# Patient Record
Sex: MALE | Race: WHITE | NOT HISPANIC OR LATINO | Employment: FULL TIME | URBAN - METROPOLITAN AREA
[De-identification: names, ages, dates, MRNs, and addresses within clinical notes are randomized per-mention and may not be internally consistent; named-entity substitution may affect disease eponyms.]

---

## 2022-11-30 ENCOUNTER — PATIENT ROUNDING (BHMG ONLY) (OUTPATIENT)
Dept: FAMILY MEDICINE CLINIC | Facility: CLINIC | Age: 42
End: 2022-11-30

## 2022-11-30 ENCOUNTER — OFFICE VISIT (OUTPATIENT)
Dept: FAMILY MEDICINE CLINIC | Facility: CLINIC | Age: 42
End: 2022-11-30

## 2022-11-30 VITALS
SYSTOLIC BLOOD PRESSURE: 140 MMHG | HEART RATE: 84 BPM | HEIGHT: 74 IN | OXYGEN SATURATION: 96 % | TEMPERATURE: 98 F | WEIGHT: 315 LBS | DIASTOLIC BLOOD PRESSURE: 84 MMHG | BODY MASS INDEX: 40.43 KG/M2

## 2022-11-30 DIAGNOSIS — Z23 IMMUNIZATION DUE: ICD-10-CM

## 2022-11-30 DIAGNOSIS — E11.65 TYPE 2 DIABETES MELLITUS WITH HYPERGLYCEMIA, WITHOUT LONG-TERM CURRENT USE OF INSULIN: Primary | ICD-10-CM

## 2022-11-30 DIAGNOSIS — Z00.00 PREVENTATIVE HEALTH CARE: ICD-10-CM

## 2022-11-30 DIAGNOSIS — Z12.5 SCREENING FOR MALIGNANT NEOPLASM OF PROSTATE: ICD-10-CM

## 2022-11-30 DIAGNOSIS — E29.1 HYPOGONADISM IN MALE: ICD-10-CM

## 2022-11-30 DIAGNOSIS — Z51.81 ENCOUNTER FOR THERAPEUTIC DRUG MONITORING: ICD-10-CM

## 2022-11-30 PROBLEM — E66.01 CLASS 3 SEVERE OBESITY DUE TO EXCESS CALORIES WITH BODY MASS INDEX (BMI) OF 45.0 TO 49.9 IN ADULT: Status: ACTIVE | Noted: 2022-11-30

## 2022-11-30 PROBLEM — Z80.0 FAMILY HISTORY OF COLON CANCER: Status: ACTIVE | Noted: 2022-11-30

## 2022-11-30 PROBLEM — E03.9 HYPOTHYROIDISM: Status: ACTIVE | Noted: 2022-11-30

## 2022-11-30 PROBLEM — E66.813 CLASS 3 SEVERE OBESITY DUE TO EXCESS CALORIES WITH BODY MASS INDEX (BMI) OF 45.0 TO 49.9 IN ADULT: Status: ACTIVE | Noted: 2022-11-30

## 2022-11-30 PROCEDURE — 90686 IIV4 VACC NO PRSV 0.5 ML IM: CPT | Performed by: PHYSICIAN ASSISTANT

## 2022-11-30 PROCEDURE — 99204 OFFICE O/P NEW MOD 45 MIN: CPT | Performed by: PHYSICIAN ASSISTANT

## 2022-11-30 PROCEDURE — 90471 IMMUNIZATION ADMIN: CPT | Performed by: PHYSICIAN ASSISTANT

## 2022-11-30 RX ORDER — NAPROXEN SODIUM 220 MG
220 TABLET ORAL 2 TIMES DAILY PRN
Status: CANCELLED | OUTPATIENT
Start: 2022-11-30

## 2022-11-30 RX ORDER — LEVOTHYROXINE SODIUM 0.05 MG/1
50 TABLET ORAL DAILY
Qty: 30 TABLET | Refills: 5 | Status: SHIPPED | OUTPATIENT
Start: 2022-11-30

## 2022-11-30 RX ORDER — EMPAGLIFLOZIN 25 MG/1
25 TABLET, FILM COATED ORAL DAILY
Qty: 90 TABLET | Refills: 1 | Status: SHIPPED | OUTPATIENT
Start: 2022-11-30

## 2022-11-30 RX ORDER — TESTOSTERONE CYPIONATE 200 MG/ML
INJECTION, SOLUTION INTRAMUSCULAR
Status: CANCELLED | OUTPATIENT
Start: 2022-11-30

## 2022-11-30 RX ORDER — MELOXICAM 15 MG/1
15 TABLET ORAL DAILY
Qty: 90 TABLET | Refills: 1 | Status: SHIPPED | OUTPATIENT
Start: 2022-11-30

## 2022-11-30 NOTE — PROGRESS NOTES
Chief Complaint   Patient presents with   • new patient preventive medicine service   • Diabetes       HPI     Aguila Shen is a pleasant 42 y.o. male with a PMH of type 2 diabetes, hypothyroidism, and hypogonadism who presents for initial visit.     Previous PCP retired, Dr. Elias. The patient works at the [employer].   Diabetes was diagnosed about 1 year ago.  He did not tolerate metformin due to GI side effects.  He does have family history of diabetes.   He was started on levothyroxine due to his thyroid function tests being off. No labs in the past year but reports compliance with medication.  He has been following with Dr. Rosa a urologist in [place], [place] for his low testosterone.  He was seen there a couple of weeks ago for blood work with like to have his testosterone managed locally.  He was initially seen there for fertility issues but everything checked out. He has 2 children now and this is no longer a concern. He injects testosterone half a milliliter once weekly which has improved fatigue.   He has a bump on his right chest for several years.   Takes meloxicam for joint pain for several years. This improves ankle pain mostly.     Past Medical History:   Diagnosis Date   • Diabetes mellitus (HCC)        Past Surgical History:   Procedure Laterality Date   • VASECTOMY         Family History   Problem Relation Age of Onset   • Hypertension Mother    • Arthritis Mother    • Hyperlipidemia Mother    • Hypertension Father    • Diabetes Father    • Cancer Brother    • Hypertension Maternal Grandmother    • Diabetes Maternal Grandmother    • Hypertension Maternal Grandfather    • Diabetes Maternal Grandfather    • Hypertension Paternal Grandmother    • Diabetes Paternal Grandmother    • Hypertension Paternal Grandfather    • Diabetes Paternal Grandfather        Social History     Socioeconomic History   • Marital status:    Tobacco Use   • Smoking status: Never   • Smokeless  tobacco: Current     Types: Chew   Vaping Use   • Vaping Use: Never used   Substance and Sexual Activity   • Alcohol use: Yes     Alcohol/week: 1.0 standard drink     Types: 1 Cans of beer per week     Comment: socially   • Drug use: Never   • Sexual activity: Yes     Partners: Female       Allergies   Allergen Reactions   • Metformin Diarrhea       ROS    Review of Systems   Constitutional: Positive for fatigue.   Endocrine: Negative for polydipsia and polyuria.   Genitourinary: Negative for nocturia.   Musculoskeletal: Positive for arthralgias. Negative for joint swelling.       Vitals:    11/30/22 1410   BP: 140/84   Pulse: 84   Temp: 98 °F (36.7 °C)   SpO2: 96%     Body mass index is 48.92 kg/m².      Current Outpatient Medications:   •  Jardiance 25 MG tablet tablet, Take 1 tablet by mouth Daily., Disp: 90 tablet, Rfl: 1  •  levothyroxine (SYNTHROID, LEVOTHROID) 50 MCG tablet, Take 1 tablet by mouth Daily., Disp: 30 tablet, Rfl: 5  •  meloxicam (MOBIC) 15 MG tablet, Take 1 tablet by mouth Daily., Disp: 90 tablet, Rfl: 1  •  multivitamin with minerals tablet tablet, Take 1 tablet by mouth Daily., Disp: , Rfl:   •  Testosterone Cypionate (DEPOTESTOTERONE CYPIONATE) 200 MG/ML injection, INJECT 0.5 ML EVERY 7 DAYS AS DIRECTED, Disp: , Rfl:     PE    Physical Exam  Vitals reviewed.   Constitutional:       General: He is not in acute distress.     Appearance: He is well-developed. He is obese.   HENT:      Head: Normocephalic and atraumatic.   Eyes:      Conjunctiva/sclera: Conjunctivae normal.   Cardiovascular:      Rate and Rhythm: Normal rate and regular rhythm.      Heart sounds: Normal heart sounds. No murmur heard.  Pulmonary:      Effort: Pulmonary effort is normal.      Breath sounds: Normal breath sounds.   Chest:       Musculoskeletal:      Cervical back: Normal range of motion.   Skin:     General: Skin is warm and dry.   Neurological:      Mental Status: He is alert.      Gait: Gait normal.   Psychiatric:          Speech: Speech normal.         Behavior: Behavior normal.          A/P    Problem List Items Addressed This Visit        Endocrine and Metabolic    Type 2 diabetes mellitus with hyperglycemia, without long-term current use of insulin (HCC) - Primary  -Intolerance to metformin. Refill Jardiance.  -Monitor hemoglobin A1c.    Relevant Medications    Jardiance 25 MG tablet tablet   Other Visit Diagnoses     Hypogonadism in male      -This patient is on a controlled substance which improves symptoms/quality of life and is aware of the risks, benefits and possible side-effects current treatment. The patient denies any medication side-effects at this time. A controlled substance agreement will be obtained or is currently on file. We reviewed required monitoring for controlled substances including but not limited to quarterly follow-up visits, annual depression screening, and urine drug screens to which the patient is agreeable. A [facility] report has been or shortly will be reviewed. There are no signs of deviation or misuse.   -Records release requested today from urology. Once we have his records demonstrating low testosterone, we will plan to manage his testosterone here.   -Monitor labs including CBC, PSA, testosterone  -Return to clinic in 3 months for physical or sooner if needed.    Relevant Orders    Testosterone    PSA Diagnostic    Immunization due      -Influenza vaccine given today.      Preventative health care        Relevant Orders    CBC (No Diff)    Comprehensive Metabolic Panel    Hemoglobin A1c    Lipid Panel    TSH Rfx On Abnormal To Free T4    Urinalysis With Culture If Indicated - Urine, Clean Catch    Microalbumin / Creatinine Urine Ratio - Urine, Clean Catch    Hepatitis C Antibody    Encounter for therapeutic drug monitoring        Relevant Orders    Compliance Drug Analysis, Ur - Urine, Clean Catch          Plan of care was reviewed with patient at the conclusion of today's visit. Education  was provided regarding diagnoses, management, prescribed or recommended OTC products, and the importance of compliance with follow-up appointments. The patient was counseled regarding the risks, benefits, and possible side-effects of treatment. I advised the patient to keep me informed of any acute changes in their status including new, worsening, or persistent symptoms. Patient expresses understanding and agreement with the management plan.        XIAO Salazar

## 2022-12-01 ENCOUNTER — LAB (OUTPATIENT)
Dept: LAB | Facility: HOSPITAL | Age: 42
End: 2022-12-01

## 2022-12-01 DIAGNOSIS — E29.1 HYPOGONADISM IN MALE: ICD-10-CM

## 2022-12-01 DIAGNOSIS — Z00.00 PREVENTATIVE HEALTH CARE: ICD-10-CM

## 2022-12-01 LAB
ALBUMIN SERPL-MCNC: 4.5 G/DL (ref 3.5–5.2)
ALBUMIN UR-MCNC: 1.7 MG/DL
ALBUMIN/GLOB SERPL: 1.8 G/DL
ALP SERPL-CCNC: 79 U/L (ref 39–117)
ALT SERPL W P-5'-P-CCNC: 20 U/L (ref 1–41)
ANION GAP SERPL CALCULATED.3IONS-SCNC: 7 MMOL/L (ref 5–15)
AST SERPL-CCNC: 18 U/L (ref 1–40)
BILIRUB SERPL-MCNC: 0.5 MG/DL (ref 0–1.2)
BILIRUB UR QL STRIP: NEGATIVE
BUN SERPL-MCNC: 10 MG/DL (ref 6–20)
BUN/CREAT SERPL: 11.6 (ref 7–25)
CALCIUM SPEC-SCNC: 9.2 MG/DL (ref 8.6–10.5)
CHLORIDE SERPL-SCNC: 104 MMOL/L (ref 98–107)
CHOLEST SERPL-MCNC: 151 MG/DL (ref 0–200)
CLARITY UR: CLEAR
CO2 SERPL-SCNC: 27 MMOL/L (ref 22–29)
COLOR UR: YELLOW
CREAT SERPL-MCNC: 0.86 MG/DL (ref 0.76–1.27)
CREAT UR-MCNC: 158.6 MG/DL
DEPRECATED RDW RBC AUTO: 50 FL (ref 37–54)
EGFRCR SERPLBLD CKD-EPI 2021: 109.5 ML/MIN/1.73
ERYTHROCYTE [DISTWIDTH] IN BLOOD BY AUTOMATED COUNT: 15.8 % (ref 12.3–15.4)
GLOBULIN UR ELPH-MCNC: 2.5 GM/DL
GLUCOSE SERPL-MCNC: 113 MG/DL (ref 65–99)
GLUCOSE UR STRIP-MCNC: ABNORMAL MG/DL
HCT VFR BLD AUTO: 46.5 % (ref 37.5–51)
HCV AB SER DONR QL: NORMAL
HDLC SERPL-MCNC: 34 MG/DL (ref 40–60)
HGB BLD-MCNC: 14.6 G/DL (ref 13–17.7)
HGB UR QL STRIP.AUTO: NEGATIVE
KETONES UR QL STRIP: ABNORMAL
LDLC SERPL CALC-MCNC: 98 MG/DL (ref 0–100)
LDLC/HDLC SERPL: 2.83 {RATIO}
LEUKOCYTE ESTERASE UR QL STRIP.AUTO: NEGATIVE
MCH RBC QN AUTO: 27.1 PG (ref 26.6–33)
MCHC RBC AUTO-ENTMCNC: 31.4 G/DL (ref 31.5–35.7)
MCV RBC AUTO: 86.4 FL (ref 79–97)
MICROALBUMIN/CREAT UR: 10.7 MG/G
NITRITE UR QL STRIP: NEGATIVE
PH UR STRIP.AUTO: 6 [PH] (ref 5–8)
PLATELET # BLD AUTO: 330 10*3/MM3 (ref 140–450)
PMV BLD AUTO: 11 FL (ref 6–12)
POTASSIUM SERPL-SCNC: 4.5 MMOL/L (ref 3.5–5.2)
PROT SERPL-MCNC: 7 G/DL (ref 6–8.5)
PROT UR QL STRIP: ABNORMAL
PSA SERPL-MCNC: 0.51 NG/ML (ref 0–4)
RBC # BLD AUTO: 5.38 10*6/MM3 (ref 4.14–5.8)
SODIUM SERPL-SCNC: 138 MMOL/L (ref 136–145)
SP GR UR STRIP: >=1.03 (ref 1–1.03)
TESTOST SERPL-MCNC: 620 NG/DL (ref 249–836)
TRIGL SERPL-MCNC: 104 MG/DL (ref 0–150)
TSH SERPL DL<=0.05 MIU/L-ACNC: 2.49 UIU/ML (ref 0.27–4.2)
UROBILINOGEN UR QL STRIP: ABNORMAL
VLDLC SERPL-MCNC: 19 MG/DL (ref 5–40)
WBC NRBC COR # BLD: 7.51 10*3/MM3 (ref 3.4–10.8)

## 2022-12-01 PROCEDURE — 82043 UR ALBUMIN QUANTITATIVE: CPT

## 2022-12-01 PROCEDURE — 86803 HEPATITIS C AB TEST: CPT

## 2022-12-01 PROCEDURE — 80061 LIPID PANEL: CPT

## 2022-12-01 PROCEDURE — 82570 ASSAY OF URINE CREATININE: CPT

## 2022-12-01 PROCEDURE — 80050 GENERAL HEALTH PANEL: CPT

## 2022-12-01 PROCEDURE — 83036 HEMOGLOBIN GLYCOSYLATED A1C: CPT

## 2022-12-01 PROCEDURE — 81003 URINALYSIS AUTO W/O SCOPE: CPT

## 2022-12-01 PROCEDURE — 84403 ASSAY OF TOTAL TESTOSTERONE: CPT

## 2022-12-01 PROCEDURE — 84153 ASSAY OF PSA TOTAL: CPT

## 2022-12-02 ENCOUNTER — PATIENT MESSAGE (OUTPATIENT)
Dept: FAMILY MEDICINE CLINIC | Facility: CLINIC | Age: 42
End: 2022-12-02

## 2022-12-02 DIAGNOSIS — E11.65 TYPE 2 DIABETES MELLITUS WITH HYPERGLYCEMIA, WITHOUT LONG-TERM CURRENT USE OF INSULIN: Primary | ICD-10-CM

## 2022-12-02 LAB — HBA1C MFR BLD: 7 % (ref 4.8–5.6)

## 2022-12-06 LAB
DRUGS UR: NORMAL
Lab: NORMAL

## 2022-12-07 RX ORDER — TIRZEPATIDE 2.5 MG/.5ML
2.5 INJECTION, SOLUTION SUBCUTANEOUS
Qty: 2 ML | Refills: 0 | Status: SHIPPED | OUTPATIENT
Start: 2022-12-07 | End: 2023-01-04

## 2022-12-07 RX ORDER — TIRZEPATIDE 5 MG/.5ML
5 INJECTION, SOLUTION SUBCUTANEOUS
Qty: 6 ML | Refills: 0 | Status: SHIPPED | OUTPATIENT
Start: 2022-12-07 | End: 2023-02-21

## 2022-12-07 NOTE — TELEPHONE ENCOUNTER
I spoke with the patient over the phone and counseled him on the use of Mounjaro. He would like to start this for his diabetes. He denies a history of pancreatitis and personal or family history of thyroid cancer. He was advised to notify the office if he has any questions or experiences side effects. I did advise him to send me a message in a couple of weeks if he has not been contacted about his testosterone prescription. If we have not received records from urology at that time, we will need to resend his records release request.  He verbalized understanding and agreed with this plan.

## 2022-12-15 ENCOUNTER — PRIOR AUTHORIZATION (OUTPATIENT)
Dept: FAMILY MEDICINE CLINIC | Facility: CLINIC | Age: 42
End: 2022-12-15

## 2022-12-15 NOTE — TELEPHONE ENCOUNTER
Contacted pt's pharmacy to get more information, pharmacist states the prescription is requiring a PA

## 2022-12-21 NOTE — TELEPHONE ENCOUNTER
Please let the patient know his insurance denied Mounjaro because they do not consider it medically necessary.  If he has not been able to get this prescription from the pharmacy through the 's coupon, I can write an appeal letter.

## 2022-12-21 NOTE — TELEPHONE ENCOUNTER
Caller: Aguila Shen    Relationship: Self    Best call back number: [phone]    Additional notes: PATIENT STATES THE PHARMACY HAS NOT FILLED THE PRESCRIPTION FOR HIM AND WOULD LIKE TO REQUEST THAT AN APPEAL LETTER BE WRITTEN.

## 2022-12-21 NOTE — TELEPHONE ENCOUNTER
Attempted to contact, no answer. Left detailed voicemail relaying provider's message     Please let the patient know his insurance denied Mounjaro because they do not consider it medically necessary.  If he has not been able to get this prescription from the pharmacy through the 's coupon, I can write an appeal letter.  Hub can relay and document.

## 2022-12-21 NOTE — TELEPHONE ENCOUNTER
Lvm to call office back.    Jozef Ashley PA 2 hours ago (7:24 AM)     Please let the patient know his insurance denied Mounjaro because they do not consider it medically necessary.  If he has not been able to get this prescription from the pharmacy through the 's coupon, I can write an appeal letter.         Note              HUB CAN RELY MESSAGE

## 2022-12-21 NOTE — TELEPHONE ENCOUNTER
CHIEF COMPLAINT:   Chief Complaint   Patient presents with   • URI       HISTORY OF PRESENT ILLNESS: Maximo Loaiza is a 51 year old  male Who presented today secondary to symptoms of facial congestion and pressure associated with postnasal drip. Patient denied any chest congestion, denied any shortness of breath for wheezing. Does have a little bit of a cough which he attributes to postnasal drip. He does have some fullness in his ears but no pain. Patient denied any GI symptoms such as nausea vomiting abdominal pain or diarrhea. Patient denied any fever or chills but has had body aches. Symptoms are very similar to his usual symptoms of acute sinusitis.      PAST MEDICAL HISTORY:   1. Mild hyperlipidemia.    2. History of low back pain.    3. History of tinea versicolor.    4. History of seasonal allergies.    Past Surgical History   Procedure Laterality Date   • Knee scope,diagnostic  10/14/2005     left   • Colonoscopy  11/07/2013     Dr Albarado. Repeat in 10 years       MEDICATIONS:  Current Outpatient Prescriptions   Medication Sig Dispense Refill   • azithromycin (ZITHROMAX) 250 MG tablet Take 1 tablet by mouth once for 1 dose. 2 tablets first day at the same time, then one a day for the next 4 days 6 tablet 0     No current facility-administered medications for this visit.      ALLERGIES:  Allergies as of 01/23/2017 - Jason as Reviewed 01/23/2017   Allergen Reaction Noted   • Seasonal  09/25/2013     REVIEW OF SYSTEMS:  Constitutional:  Denies fever or chills, weight gain or loss.    Respiratory:  Denies shortness of breath, cough or wheezing.   Cardiovascular:  Denies chest pain or palpations, no PND or orthopnea.  Gastrointestinal:  Denies abdominal pain, nausea, vomiting, bloody stools or diarrhea.  Neurologic:  Denies headache, focal weakness or sensory changes.     PHYSICAL EXAMINATION:  Constitutional:  Well-developed, well-nourished, no acute distress, non-toxic appearance.  Respiratory: Lungs are clear.  Pt would like for you to write an appeal    No wheezing, rhonchi or rales.  Cardiovascular:  Normal rate, normal rhythm, no murmurs, no gallops, no rubs.     ASSESSMENT:   1. Acute non-recurrent maxillary sinusitis      PLAN:  Orders Placed This Encounter   • azithromycin (ZITHROMAX) 250 MG tablet     Return in about 6 months (around 7/23/2017).

## 2023-01-05 ENCOUNTER — PATIENT MESSAGE (OUTPATIENT)
Dept: FAMILY MEDICINE CLINIC | Facility: CLINIC | Age: 43
End: 2023-01-05
Payer: COMMERCIAL

## 2023-01-05 DIAGNOSIS — E29.1 HYPOGONADISM IN MALE: Primary | ICD-10-CM

## 2023-01-06 RX ORDER — TESTOSTERONE CYPIONATE 200 MG/ML
100 INJECTION, SOLUTION INTRAMUSCULAR
Qty: 2 ML | Refills: 2 | Status: SHIPPED | OUTPATIENT
Start: 2023-01-06 | End: 2023-02-21

## 2023-01-07 ENCOUNTER — PATIENT MESSAGE (OUTPATIENT)
Dept: FAMILY MEDICINE CLINIC | Facility: CLINIC | Age: 43
End: 2023-01-07
Payer: COMMERCIAL

## 2023-01-09 ENCOUNTER — TELEPHONE (OUTPATIENT)
Dept: FAMILY MEDICINE CLINIC | Facility: CLINIC | Age: 43
End: 2023-01-09
Payer: COMMERCIAL

## 2023-01-09 RX ORDER — CYCLOBENZAPRINE HCL 10 MG
10 TABLET ORAL 3 TIMES DAILY PRN
Qty: 90 TABLET | Refills: 0 | Status: SHIPPED | OUTPATIENT
Start: 2023-01-09 | End: 2023-02-06

## 2023-01-09 RX ORDER — MELOXICAM 15 MG/1
15 TABLET ORAL DAILY
Qty: 90 TABLET | Refills: 1 | OUTPATIENT
Start: 2023-01-09

## 2023-01-09 NOTE — TELEPHONE ENCOUNTER
Pt notified that he has a refill at the pharmacy.    [facility]/pharmacy #[id number] - [place], [place] - [address] - [phone] PH

## 2023-01-09 NOTE — TELEPHONE ENCOUNTER
PATIENT RETURNING A CALL REGARDING HIS MELOXICAM REFILL.  PATIENT HAS BEEN OUT OF MEDICATION SINCE 1/6/23.

## 2023-01-10 NOTE — TELEPHONE ENCOUNTER
From: Aguila Shen  To: Jozef Ashley  Sent: 1/5/2023 10:23 PM EST  Subject: Testosterone     I have a question to see if you've been able to get my medical records from [facility] yet because I'm out of testosterone and they will not refill it. I would prefer not to have to cycle off of it right now what can we do about this?

## 2023-01-11 NOTE — TELEPHONE ENCOUNTER
Contacted [facility] to see if a single or multi dose vial was dispensed. Pharmacists states that a 1ml bottle single dose vial was dispensed & the multi dose vial is on back order. Pt will need an updated script each time.

## 2023-01-30 ENCOUNTER — PATIENT MESSAGE (OUTPATIENT)
Dept: FAMILY MEDICINE CLINIC | Facility: CLINIC | Age: 43
End: 2023-01-30
Payer: COMMERCIAL

## 2023-02-06 RX ORDER — CYCLOBENZAPRINE HCL 10 MG
TABLET ORAL
Qty: 90 TABLET | Refills: 0 | Status: SHIPPED | OUTPATIENT
Start: 2023-02-06

## 2023-02-21 ENCOUNTER — OFFICE VISIT (OUTPATIENT)
Dept: FAMILY MEDICINE CLINIC | Facility: CLINIC | Age: 43
End: 2023-02-21
Payer: COMMERCIAL

## 2023-02-21 VITALS
OXYGEN SATURATION: 98 % | TEMPERATURE: 97.1 F | SYSTOLIC BLOOD PRESSURE: 134 MMHG | HEART RATE: 82 BPM | DIASTOLIC BLOOD PRESSURE: 86 MMHG | HEIGHT: 74 IN | BODY MASS INDEX: 40.43 KG/M2 | WEIGHT: 315 LBS

## 2023-02-21 DIAGNOSIS — E29.1 HYPOGONADISM IN MALE: ICD-10-CM

## 2023-02-21 DIAGNOSIS — E66.01 CLASS 3 SEVERE OBESITY DUE TO EXCESS CALORIES WITH SERIOUS COMORBIDITY AND BODY MASS INDEX (BMI) OF 45.0 TO 49.9 IN ADULT: ICD-10-CM

## 2023-02-21 DIAGNOSIS — E29.1 HYPOGONADISM, MALE: ICD-10-CM

## 2023-02-21 DIAGNOSIS — E11.65 TYPE 2 DIABETES MELLITUS WITH HYPERGLYCEMIA, WITHOUT LONG-TERM CURRENT USE OF INSULIN: ICD-10-CM

## 2023-02-21 DIAGNOSIS — Z00.00 PREVENTATIVE HEALTH CARE: Primary | ICD-10-CM

## 2023-02-21 DIAGNOSIS — E03.9 HYPOTHYROIDISM, UNSPECIFIED TYPE: ICD-10-CM

## 2023-02-21 DIAGNOSIS — Z80.0 FAMILY HISTORY OF COLON CANCER: ICD-10-CM

## 2023-02-21 PROCEDURE — 99396 PREV VISIT EST AGE 40-64: CPT | Performed by: PHYSICIAN ASSISTANT

## 2023-02-21 RX ORDER — TESTOSTERONE CYPIONATE 100 MG/ML
VIAL (ML) INTRAMUSCULAR
OUTPATIENT
Start: 2023-02-21

## 2023-02-21 RX ORDER — TESTOSTERONE CYPIONATE 200 MG/ML
100 INJECTION, SOLUTION INTRAMUSCULAR
Qty: 2 ML | Refills: 2 | OUTPATIENT
Start: 2023-02-21

## 2023-02-21 RX ORDER — LISINOPRIL 5 MG/1
5 TABLET ORAL DAILY
Qty: 90 TABLET | Refills: 1 | Status: SHIPPED | OUTPATIENT
Start: 2023-02-21

## 2023-02-21 RX ORDER — TESTOSTERONE CYPIONATE 100 MG/ML
1 VIAL (ML) INTRAMUSCULAR
Qty: 4 ML | Refills: 2 | Status: SHIPPED | OUTPATIENT
Start: 2023-02-21 | End: 2023-02-27

## 2023-02-21 RX ORDER — ROSUVASTATIN CALCIUM 5 MG/1
5 TABLET, COATED ORAL DAILY
Qty: 90 TABLET | Refills: 1 | Status: SHIPPED | OUTPATIENT
Start: 2023-02-21

## 2023-02-21 NOTE — TELEPHONE ENCOUNTER
Rx Refill Note  Requested Prescriptions     Pending Prescriptions Disp Refills   • Testosterone Cypionate (DEPOTESTOTERONE CYPIONATE) 200 MG/ML injection [Pharmacy Med Name: TESTOSTERONE  MG/ML] 2 mL 2     Sig: INJECT 0.5 ML INTO THE APPROPRIATE MUSCLE AS DIRECTED BY PRESCRIBER EVERY 7 (SEVEN) DAYS.      Last office visit with prescribing clinician: Visit date not found   Last telemedicine visit with prescribing clinician: 2/21/2023   Next office visit with prescribing clinician: Visit date not found                         Would you like a call back once the refill request has been completed: [] Yes [] No    If the office needs to give you a call back, can they leave a voicemail: [] Yes [] No    Heydi Garces MA  02/21/23, 07:36 EST

## 2023-02-21 NOTE — PROGRESS NOTES
"Reason for visit    Aguila Shen is a 42 y.o. male who presents for annual comprehensive exam.    Chief Complaint   Patient presents with   • Annual Exam     Not fasting for labs   • Type 2 diabetes mellitus with hyperglycemia   • Hypogonadism   • Diabetic Eye Exam     Completed 1-2023       HPI     Here for physical.   His insurance would not cover Mounjaro because his A1c was not greater than 7.0.    Diet/Physical activity:  -He reports his diet is \"about the same.\" He has been working on portion control however and has lost 11 pounds since November.   -He does not follow a low carbohydrate diet.   -Interested in referral to dietician.   -He reports \"not much\" exercise outside of his work.     Immunizations:  -He cannot recall prior pneumococcal vaccination.   -He declines COVID vaccination.     Cancer screening:   -Positive Fhx: colon cancer (brother - age 44).  -Negative Fhx: prostate, testicular cancer.   -He reports a colonoscopy last year was normal.      Depression: PHQ-2 Depression Screening  -Negative.     Substance use:  -Current smokeless tobacco use.   -Declines recreational drug use.   -2 beers/month.  -2-3 sodas per week.      Sexual health:  -Monogamous relationship x 6 years.      Dental/vision/skin:  -Overdue for dental exam.   -He reports an eye exam in January but did not have a dilated retinal exam.   -    Past Medical History:   Diagnosis Date   • Diabetes mellitus (HCC)        Past Surgical History:   Procedure Laterality Date   • VASECTOMY         Family History   Problem Relation Age of Onset   • Hypertension Mother    • Arthritis Mother    • Hyperlipidemia Mother    • Hypertension Father    • Diabetes Father    • Cancer Brother    • Hypertension Maternal Grandmother    • Diabetes Maternal Grandmother    • Hypertension Maternal Grandfather    • Diabetes Maternal Grandfather    • Hypertension Paternal Grandmother    • Diabetes Paternal Grandmother    • Hypertension Paternal Grandfather    • " Diabetes Paternal Grandfather        Social History     Socioeconomic History   • Marital status:    Tobacco Use   • Smoking status: Never   • Smokeless tobacco: Current     Types: Chew   Vaping Use   • Vaping Use: Never used   Substance and Sexual Activity   • Alcohol use: Yes     Alcohol/week: 1.0 standard drink     Types: 1 Cans of beer per week     Comment: socially   • Drug use: Never   • Sexual activity: Yes     Partners: Female       Allergies   Allergen Reactions   • Metformin Diarrhea       ROS    Review of Systems   Constitutional: Negative for chills, diaphoresis, fever and unexpected weight loss.   HENT: Positive for congestion and postnasal drip. Negative for hearing loss, rhinorrhea and sore throat.    Eyes: Negative for blurred vision and visual disturbance.   Respiratory: Negative for cough and shortness of breath.    Cardiovascular: Negative for chest pain.   Gastrointestinal: Positive for GERD (occasional). Negative for abdominal pain, blood in stool, constipation and diarrhea.   Endocrine: Negative for polydipsia.   Genitourinary: Negative for difficulty urinating, dysuria, hematuria, nocturia, scrotal swelling and testicular pain.   Musculoskeletal: Positive for arthralgias. Negative for myalgias.   Skin: Negative for rash and skin lesions.   Allergic/Immunologic: Positive for environmental allergies.   Neurological: Negative for dizziness, numbness and headache.   Psychiatric/Behavioral: Positive for sleep disturbance. Negative for depressed mood. The patient is not nervous/anxious.        Vitals:    02/21/23 1426   BP: 134/86   Pulse:    Temp:    SpO2:      Body mass index is 47.51 kg/m².      Current Outpatient Medications:   •  Jardiance 25 MG tablet tablet, Take 1 tablet by mouth Daily., Disp: 90 tablet, Rfl: 1  •  levothyroxine (SYNTHROID, LEVOTHROID) 50 MCG tablet, Take 1 tablet by mouth Daily., Disp: 30 tablet, Rfl: 5  •  meloxicam (MOBIC) 15 MG tablet, Take 1 tablet by mouth  "Daily., Disp: 90 tablet, Rfl: 1  •  multivitamin with minerals tablet tablet, Take 1 tablet by mouth Daily., Disp: , Rfl:   •  Syringe/Needle, Disp, 25G X 1\" 1 ML misc, Use to inject 0.5 ml of testosterone once every 7 days, Disp: 25 each, Rfl: 1  •  Testosterone Cypionate (DEPOTESTOTERONE CYPIONATE) 200 MG/ML injection, Inject 0.5 mL into the appropriate muscle as directed by prescriber Every 7 (Seven) Days., Disp: 2 mL, Rfl: 2  •  cyclobenzaprine (FLEXERIL) 10 MG tablet, TAKE 1 TABLET BY MOUTH 3 TIMES A DAY AS NEEDED FOR MUSCLE SPASMS., Disp: 90 tablet, Rfl: 0  •  lisinopril (PRINIVIL,ZESTRIL) 5 MG tablet, Take 1 tablet by mouth Daily., Disp: 90 tablet, Rfl: 1  •  rosuvastatin (Crestor) 5 MG tablet, Take 1 tablet by mouth Daily., Disp: 90 tablet, Rfl: 1    PE     Physical Exam  Vitals reviewed.   Constitutional:       General: He is not in acute distress.     Appearance: He is well-developed. He is obese.   HENT:      Head: Normocephalic and atraumatic.      Right Ear: Hearing and tympanic membrane normal.      Left Ear: Hearing and tympanic membrane normal.      Mouth/Throat:      Mouth: Mucous membranes are moist.      Dentition: Normal dentition.      Pharynx: Oropharynx is clear.   Eyes:      Extraocular Movements: Extraocular movements intact.      Conjunctiva/sclera: Conjunctivae normal.      Pupils: Pupils are equal, round, and reactive to light.      Comments:      Neck:      Thyroid: No thyroid mass or thyromegaly.      Vascular: No carotid bruit.   Cardiovascular:      Rate and Rhythm: Normal rate and regular rhythm.      Pulses:           Dorsalis pedis pulses are 2+ on the right side and 2+ on the left side.        Posterior tibial pulses are 2+ on the right side and 2+ on the left side.      Heart sounds: No murmur heard.    No friction rub.   Pulmonary:      Effort: Pulmonary effort is normal.      Breath sounds: Normal breath sounds.   Abdominal:      General: Bowel sounds are normal. There is no " abdominal bruit.      Palpations: Abdomen is soft. There is no mass.      Tenderness: There is no abdominal tenderness.   Musculoskeletal:         General: Normal range of motion.      Cervical back: Normal range of motion and neck supple.      Right foot: No deformity.      Left foot: No deformity.   Feet:      Right foot:      Protective Sensation: 6 sites tested. 6 sites sensed.      Skin integrity: Skin integrity normal. No ulcer or erythema.      Left foot:      Protective Sensation: 6 sites tested. 6 sites sensed.      Skin integrity: Skin integrity normal. No ulcer or erythema.      Comments: Diabetic Foot Exam Performed  Lymphadenopathy:      Cervical: No cervical adenopathy.      Upper Body:      Right upper body: No supraclavicular adenopathy.      Left upper body: No supraclavicular adenopathy.   Skin:     General: Skin is warm and dry.      Findings: No rash.      Nails: There is no clubbing.      Comments: No suspicious nevi   Neurological:      Mental Status: He is alert.      Gait: Gait normal.      Deep Tendon Reflexes: Reflexes normal.   Psychiatric:         Speech: Speech normal.         Behavior: Behavior normal.         A/P    Problem List Items Addressed This Visit        Endocrine and Metabolic    Type 2 diabetes mellitus with hyperglycemia, without long-term current use of insulin ([facility])  -A1c was 7.0 on 12/1/2022.  -Continue Jardiance 25 mg daily at this time.  -Refer to a dietitian.  -Monitor A1c in 2 weeks.  Patient will go to the lab in [place] to have this test completed at that time.  Further management pending results  -He is interested in starting rosuvastatin and lisinopril after discussion.   -Return to clinic in 3 months.    Relevant Medications    Jardiance 25 MG tablet tablet    Other Relevant Orders    Ambulatory Referral to Nutrition Services    Hemoglobin A1c    Hypothyroidism  -TSH normal on December labs.  -Continue levothyroxine.    Relevant Medications    levothyroxine  (SYNTHROID, LEVOTHROID) 50 MCG tablet    Hypogonadism, male  -Doing well.   -Continue testosterone injections weekly.   -Return to clinic in 3 months for controlled substance monitoring.      Class 3 severe obesity due to excess calories with body mass index (BMI) of 45.0 to 49.9 in adult (HCC)  -Class 3 Severe Obesity (BMI >=40). Obesity-related health conditions include the following: diabetes mellitus, GERD and osteoarthritis. Obesity is improving with lifestyle modifications. BMI is is above average; BMI management plan is completed. We discussed low calorie, low carb based diet program, portion control and increasing exercise.  -Patient would be interested in a trial of Ozempic or Wegovy if we can get this covered by insurance.  We will wait to see what his A1c is here in a couple of weeks.       Family History    Family history of colon cancer  -Prior negative colonoscopy last year per patient.  -Plan to recheck colonoscopy in 2027.     Other Visit Diagnoses     Preventative health care    -  Primary  -Counseled patient regarding cancer screening, immunizations, healthy lifestyle, diet, maintaining a healthy weight, and exercise.   -Annual dental and dilated eye exams were encouraged.   -Patient wants to consider Prevnar 20.  Information provided.          Plan of care was reviewed with patient at the conclusion of today's visit. Education was provided regarding diagnoses, management, treatment plan, and the importance of keeping follow-up appointments. The patient was counseled regarding the risks, benefits, and possible side-effects of treatment. Patient and/or family expresses understanding and agreement with the management plan.        XIAO Salazar

## 2023-02-23 ENCOUNTER — PRIOR AUTHORIZATION (OUTPATIENT)
Dept: FAMILY MEDICINE CLINIC | Facility: CLINIC | Age: 43
End: 2023-02-23
Payer: COMMERCIAL

## 2023-02-23 NOTE — TELEPHONE ENCOUNTER
(Key: [id number])  Med:Testosterone Cypionate 100MG/ML  Status:sent to plan, awaiting determination  Created:02/23/23

## 2023-02-24 ENCOUNTER — TELEPHONE (OUTPATIENT)
Dept: FAMILY MEDICINE CLINIC | Facility: CLINIC | Age: 43
End: 2023-02-24
Payer: COMMERCIAL

## 2023-02-24 ENCOUNTER — PATIENT MESSAGE (OUTPATIENT)
Dept: FAMILY MEDICINE CLINIC | Facility: CLINIC | Age: 43
End: 2023-02-24
Payer: COMMERCIAL

## 2023-02-24 DIAGNOSIS — E29.1 HYPOGONADISM IN MALE: Primary | ICD-10-CM

## 2023-02-24 NOTE — TELEPHONE ENCOUNTER
"Caller: Aguila Shen    Relationship: Self    Best call back number: [phone]    What medications are you currently taking:   Current Outpatient Medications on File Prior to Visit   Medication Sig Dispense Refill   • cyclobenzaprine (FLEXERIL) 10 MG tablet TAKE 1 TABLET BY MOUTH 3 TIMES A DAY AS NEEDED FOR MUSCLE SPASMS. 90 tablet 0   • Jardiance 25 MG tablet tablet Take 1 tablet by mouth Daily. 90 tablet 1   • levothyroxine (SYNTHROID, LEVOTHROID) 50 MCG tablet Take 1 tablet by mouth Daily. 30 tablet 5   • lisinopril (PRINIVIL,ZESTRIL) 5 MG tablet Take 1 tablet by mouth Daily. 90 tablet 1   • meloxicam (MOBIC) 15 MG tablet Take 1 tablet by mouth Daily. 90 tablet 1   • multivitamin with minerals tablet tablet Take 1 tablet by mouth Daily.     • rosuvastatin (Crestor) 5 MG tablet Take 1 tablet by mouth Daily. 90 tablet 1   • Syringe/Needle, Disp, 25G X 1\" 1 ML misc Use to inject 0.5 ml of testosterone once every 7 days 25 each 1   • Testosterone Cypionate 100 MG/ML solution Inject 1 mL into the appropriate muscle as directed by prescriber Every 7 (Seven) Days. 4 mL 2     No current facility-administered medications on file prior to visit.      When did you start taking these medications: HAS ALREADY TAKEN THE TESTOSTERONE, BUT HAS NEVER TAKEN ROSUVASTATIN.    Which medication are you concerned about: TESTOSTERONE AND ROSUVASTATIN    Who prescribed you this medication: ADNREI GILBERT    What are your concerns: PATIENT STATES THAT THE PHARMACY SAID THAT THE ROSUVASTATIN COULD NOT BE PICKED UP DUE TO IT BEING \"TOO EARLY FOR .\"     PATIENT STATES THAT THE PHARMACY DOES NOT STOCK THE 100MG/ML TESTOSTERONE VIALS SO HE NEEDS A NEW PRESCRIPTION FOR THE 200MG/ML    How long have you had these concerns: 2/23/23    "

## 2023-02-27 RX ORDER — TESTOSTERONE CYPIONATE 200 MG/ML
200 INJECTION, SOLUTION INTRAMUSCULAR
Qty: 2 ML | Refills: 2 | Status: SHIPPED | OUTPATIENT
Start: 2023-02-27

## 2023-02-27 NOTE — TELEPHONE ENCOUNTER
Contacted pharmacy to inquire further. Pharmacists states that the previous Testosterone scripts were for 200ml injecting 0.5ml and that is covered by insurance however the new script of 100ml injecting 1ml is not covered even though the injection amount is the same. Pharmacy requesting the script be sent like the previous.  Rosuvastatin was not requested by pt originally however they will get that one ready.

## 2023-04-11 ENCOUNTER — PRIOR AUTHORIZATION (OUTPATIENT)
Dept: FAMILY MEDICINE CLINIC | Facility: CLINIC | Age: 43
End: 2023-04-11
Payer: COMMERCIAL

## 2023-04-25 ENCOUNTER — PATIENT MESSAGE (OUTPATIENT)
Dept: FAMILY MEDICINE CLINIC | Facility: CLINIC | Age: 43
End: 2023-04-25
Payer: COMMERCIAL

## 2023-04-25 DIAGNOSIS — E29.1 HYPOGONADISM IN MALE: Primary | ICD-10-CM

## 2023-04-26 NOTE — TELEPHONE ENCOUNTER
From: Aguila Shen  To: Jozef Ashley  Sent: 4/25/2023 10:29 AM EDT  Subject: Info    I have a question with use changing how I do my testosterone to biweekly from weekly I feel very santillan that second week and even my wife has noticed I'm being very short. Also I've noticed a change in my sleep patterns like im not sleeping as good that 2nd week, im aware shift work does play into that but sarah been doing the same shift schedule for 9 months now. Will my levels drop that much from week to week that this be affecting me like that? If so I want to switch back to the weekly dosing.

## 2023-04-27 ENCOUNTER — PATIENT MESSAGE (OUTPATIENT)
Dept: FAMILY MEDICINE CLINIC | Facility: CLINIC | Age: 43
End: 2023-04-27
Payer: COMMERCIAL

## 2023-04-28 ENCOUNTER — PRIOR AUTHORIZATION (OUTPATIENT)
Dept: FAMILY MEDICINE CLINIC | Facility: CLINIC | Age: 43
End: 2023-04-28
Payer: COMMERCIAL

## 2023-04-28 NOTE — TELEPHONE ENCOUNTER
(Key: [id number])   Med:Testosterone Cypionate 200MG/ML intramuscular solution  Status:sent to plan, awaiting determination   Created:01/28/2023

## 2023-05-11 ENCOUNTER — LAB (OUTPATIENT)
Dept: LAB | Facility: HOSPITAL | Age: 43
End: 2023-05-11
Payer: COMMERCIAL

## 2023-05-11 DIAGNOSIS — E11.65 TYPE 2 DIABETES MELLITUS WITH HYPERGLYCEMIA, WITHOUT LONG-TERM CURRENT USE OF INSULIN: ICD-10-CM

## 2023-05-11 DIAGNOSIS — E29.1 HYPOGONADISM IN MALE: ICD-10-CM

## 2023-05-11 PROCEDURE — 84403 ASSAY OF TOTAL TESTOSTERONE: CPT

## 2023-05-11 PROCEDURE — 84402 ASSAY OF FREE TESTOSTERONE: CPT

## 2023-05-11 PROCEDURE — 83036 HEMOGLOBIN GLYCOSYLATED A1C: CPT

## 2023-05-12 LAB — HBA1C MFR BLD: 6.7 % (ref 4.8–5.6)

## 2023-05-15 LAB
TESTOST FREE SERPL-MCNC: 11.1 PG/ML (ref 6.8–21.5)
TESTOST SERPL-MCNC: 186 NG/DL (ref 264–916)

## 2023-05-16 ENCOUNTER — PATIENT MESSAGE (OUTPATIENT)
Dept: FAMILY MEDICINE CLINIC | Facility: CLINIC | Age: 43
End: 2023-05-16
Payer: COMMERCIAL

## 2023-05-17 DIAGNOSIS — E29.1 HYPOGONADISM IN MALE: ICD-10-CM

## 2023-05-17 NOTE — TELEPHONE ENCOUNTER
From: Aguila Shen  To: Jozef Ashley  Sent: 5/16/2023 9:14 AM EDT  Subject: Lab results     So after seeing my lab results what are we thinking? I don't remember how low I've seen it before but with my levels falling like that does that explain what I told you I'm feeling? Can we switch back to a weekly dose? Please advise on what we can do cause this feels like I'm on a roller-coaster and I don't like it at all.

## 2023-05-18 RX ORDER — TESTOSTERONE CYPIONATE 200 MG/ML
100 INJECTION, SOLUTION INTRAMUSCULAR
Qty: 4 ML | Refills: 0 | Status: SHIPPED | OUTPATIENT
Start: 2023-05-18

## 2023-06-08 ENCOUNTER — OFFICE VISIT (OUTPATIENT)
Dept: FAMILY MEDICINE CLINIC | Facility: CLINIC | Age: 43
End: 2023-06-08
Payer: COMMERCIAL

## 2023-06-08 VITALS
WEIGHT: 315 LBS | BODY MASS INDEX: 40.43 KG/M2 | DIASTOLIC BLOOD PRESSURE: 70 MMHG | HEART RATE: 70 BPM | TEMPERATURE: 98.4 F | HEIGHT: 74 IN | SYSTOLIC BLOOD PRESSURE: 120 MMHG | OXYGEN SATURATION: 96 %

## 2023-06-08 DIAGNOSIS — Z80.0 FAMILY HISTORY OF COLON CANCER: ICD-10-CM

## 2023-06-08 DIAGNOSIS — E11.65 TYPE 2 DIABETES MELLITUS WITH HYPERGLYCEMIA, WITHOUT LONG-TERM CURRENT USE OF INSULIN: Primary | ICD-10-CM

## 2023-06-08 DIAGNOSIS — E66.01 CLASS 3 SEVERE OBESITY WITH SERIOUS COMORBIDITY AND BODY MASS INDEX (BMI) OF 45.0 TO 49.9 IN ADULT, UNSPECIFIED OBESITY TYPE: ICD-10-CM

## 2023-06-08 DIAGNOSIS — E29.1 HYPOGONADISM IN MALE: ICD-10-CM

## 2023-06-08 DIAGNOSIS — E03.9 HYPOTHYROIDISM, UNSPECIFIED TYPE: ICD-10-CM

## 2023-06-08 NOTE — PROGRESS NOTES
"Chief Complaint   Patient presents with   • Establish Care       Subjective      Aguila Shen is a 42 y.o. who presents to [facility]. Lives in [place] with his wife and two girls (3 and 1).  Works at [employer].    Diabetes - diagnosed a year ago.  Taking Jardiance.  Could not tolerate metformin d/t GI side effects. Had eye exam in January, but did not get diabetic eye exam, knows now to request. Obesity - waiting on nutrition referral from previous provider.  No history of thyroid cancer. On statin. On ACE.     Has SHANE.  Uses cpap.    Hypothyroidism - takes levothyroxine.  Last labs 6 months ago and normal.     Testosterone - previous primary care was managing. Recent increase to weekly from every other week d/t low testosterone level.     Brother had Colon CA.  Diagnosed in 40's.  Had colonoscopy and it was clear 2021.  Repeat in 5 years.    The following portions of the patient's history were reviewed and updated as appropriate: allergies, current medications, past family history, past medical history, past social history, past surgical history and problem list.    Review of Systems   Constitutional:  Negative for chills, fatigue and fever.   Eyes: Negative.    Respiratory:  Negative for chest tightness and shortness of breath.    Cardiovascular:  Negative for chest pain and palpitations.   Gastrointestinal: Negative.    Genitourinary:  Negative for dysuria, hematuria, nocturia, erectile dysfunction, penile swelling, scrotal swelling and testicular pain.   Musculoskeletal:  Positive for myalgias. Negative for joint swelling.   Neurological:  Negative for dizziness and headache.   Psychiatric/Behavioral:  Positive for stress. Negative for agitation, sleep disturbance and depressed mood. The patient is not nervous/anxious.      Objective   Vital Signs:  /70 (BP Location: Right arm, Patient Position: Sitting, Cuff Size: Adult)   Pulse 70   Temp 98.4 °F (36.9 °C) (Infrared)   Ht 188 cm (74\")   Wt " (!) 164 kg (362 lb 9.6 oz)   SpO2 96%   BMI 46.56 kg/m²               Physical Exam  Vitals and nursing note reviewed.   Constitutional:       Appearance: Normal appearance.   Cardiovascular:      Rate and Rhythm: Normal rate and regular rhythm.      Heart sounds: Normal heart sounds.   Pulmonary:      Breath sounds: Normal breath sounds.   Abdominal:      General: Bowel sounds are normal.      Palpations: Abdomen is soft.      Tenderness: There is no abdominal tenderness.   Neurological:      General: No focal deficit present.      Mental Status: He is alert and oriented to person, place, and time.   Psychiatric:         Mood and Affect: Mood normal.         Behavior: Behavior normal.          Result Review                     Assessment and Plan  Diagnoses and all orders for this visit:    1. Type 2 diabetes mellitus with hyperglycemia, without long-term current use of insulin (Primary)  -     Ambulatory Referral to Diabetic Education    2. Hypogonadism in male    3. Hypothyroidism, unspecified type    4. Family history of colon cancer    5. Class 3 severe obesity with serious comorbidity and body mass index (BMI) of 45.0 to 49.9 in adult, unspecified obesity type  -     Ambulatory Referral to Diabetic Education      Referral to diabetic educator.  Patient to return for labs. Consider adding Ozempic with results. Patient is agreeable with this. Follow up in 3 months for recheck.    Will consult with physician regarding refills on testosterone.      Discussed medications prescribed and OTC medications recommended.  Discussed medication safety, possible side effects and how to take or administer medications. Instructed patient to report any adverse reactions, side effects or concerns.     Reviewed physical exam findings and plan with patient who verbalized understanding and agrees with plan of care. Patient was given opportunity to ask questions and all concerns were addressed prior to the conclusion of today's  visit.           Follow Up  Return in about 3 months (around 9/8/2023).  Patient was given instructions and counseling regarding his condition or for health maintenance advice. Please see specific information pulled into the AVS if appropriate.

## 2023-06-08 NOTE — TELEPHONE ENCOUNTER
Patient had decrease to every other week injections in the last 6 months and last testosterone was low and he was symptomatic, so he was increased again to weekly injections by previous provider.  He has one dose left and needs refill.

## 2023-06-09 ENCOUNTER — PATIENT MESSAGE (OUTPATIENT)
Dept: FAMILY MEDICINE CLINIC | Facility: CLINIC | Age: 43
End: 2023-06-09
Payer: COMMERCIAL

## 2023-06-09 DIAGNOSIS — G47.33 OBSTRUCTIVE SLEEP APNEA: Primary | ICD-10-CM

## 2023-06-09 RX ORDER — TESTOSTERONE CYPIONATE 200 MG/ML
100 INJECTION, SOLUTION INTRAMUSCULAR
Qty: 4 ML | Refills: 2 | Status: SHIPPED | OUTPATIENT
Start: 2023-06-09

## 2023-06-09 NOTE — TELEPHONE ENCOUNTER
From: Aguila Shen  To: Danni Guevara  Sent: 6/9/2023 2:07 PM EDT  Subject: Cpap    I have a very simple question for you, I currently need to get some cpap supplies and I'm having issues doing that with. Do you have any recommendations on a place to get them through? I was using [facility] in [place] and they can't figure things out to get me them. Whatever place you can refer me to will be greatly appreciated I know they will need a prescription as well. This has been headache dealing with with this and I need some help resolving this please. Thank you!!!

## 2023-06-13 ENCOUNTER — TELEPHONE (OUTPATIENT)
Dept: FAMILY MEDICINE CLINIC | Facility: CLINIC | Age: 43
End: 2023-06-13
Payer: COMMERCIAL

## 2023-06-13 NOTE — TELEPHONE ENCOUNTER
Regarding: FW: Cpap  Contact: [phone]  I put an order in for cpap supplies. (Should be on printer).  Can someone please fax to [facility] and then call the patient to let him know it was faxed and provide a contact number for [facility] so he can follow up?    Thank you!    ----- Message -----  From: Aguila Shen  Sent: 6/9/2023   9:24 PM EDT  To: PETERSON Preston  Subject: Cpap                                             Yes please, will they contact me or should I get there number and contact them?

## 2023-06-15 ENCOUNTER — PATIENT ROUNDING (BHMG ONLY) (OUTPATIENT)
Dept: FAMILY MEDICINE CLINIC | Facility: CLINIC | Age: 43
End: 2023-06-15
Payer: COMMERCIAL

## 2023-06-15 NOTE — PROGRESS NOTES
A My-Chart message has been sent to the patient for PATIENT ROUNDING with [facility].

## 2023-08-10 ENCOUNTER — HOSPITAL ENCOUNTER (OUTPATIENT)
Dept: NUTRITION | Facility: HOSPITAL | Age: 43
Setting detail: RECURRING SERIES
Discharge: HOME | End: 2023-08-10
Payer: COMMERCIAL

## 2023-08-10 PROCEDURE — 97802 MEDICAL NUTRITION INDIV IN: CPT

## 2023-08-10 NOTE — PROGRESS NOTES
The patient attended their scheduled 60 minute Type II Diabetes Nutrition only education visit today. Please see media tab for assessment and notes if you use EPIC. If you are not an EPIC user a copy of patient's assessment and notes will be sent per routine. Thank you.

## 2023-08-19 NOTE — TELEPHONE ENCOUNTER
Rx Refill Note  Requested Prescriptions     Pending Prescriptions Disp Refills    rosuvastatin (CRESTOR) 5 MG tablet [Pharmacy Med Name: ROSUVASTATIN CALCIUM 5 MG TAB] 90 tablet 1     Sig: TAKE 1 TABLET BY MOUTH EVERY DAY    lisinopril (PRINIVIL,ZESTRIL) 5 MG tablet [Pharmacy Med Name: LISINOPRIL 5 MG TABLET] 90 tablet 1     Sig: TAKE 1 TABLET BY MOUTH EVERY DAY      Last office visit with prescribing clinician: 2/21/2023   Last telemedicine visit with prescribing clinician: Visit date not found   Next office visit with prescribing clinician: Visit date not found                         Would you like a call back once the refill request has been completed: [] Yes [] No    If the office needs to give you a call back, can they leave a voicemail: [] Yes [] No    Margarita Posadas MA  08/19/23, 10:55 EDT

## 2023-08-21 RX ORDER — ROSUVASTATIN CALCIUM 5 MG/1
TABLET, COATED ORAL
Qty: 90 TABLET | Refills: 1 | Status: SHIPPED | OUTPATIENT
Start: 2023-08-21

## 2023-08-21 RX ORDER — MELOXICAM 15 MG/1
TABLET ORAL
Qty: 30 TABLET | Refills: 0 | Status: SHIPPED | OUTPATIENT
Start: 2023-08-21

## 2023-08-21 RX ORDER — LISINOPRIL 5 MG/1
TABLET ORAL
Qty: 90 TABLET | Refills: 1 | Status: SHIPPED | OUTPATIENT
Start: 2023-08-21

## 2023-08-24 ENCOUNTER — LAB (OUTPATIENT)
Dept: LAB | Facility: HOSPITAL | Age: 43
End: 2023-08-24
Payer: COMMERCIAL

## 2023-08-24 PROCEDURE — 83036 HEMOGLOBIN GLYCOSYLATED A1C: CPT

## 2023-08-24 PROCEDURE — 80050 GENERAL HEALTH PANEL: CPT

## 2023-08-24 PROCEDURE — 84439 ASSAY OF FREE THYROXINE: CPT

## 2023-08-24 PROCEDURE — 80061 LIPID PANEL: CPT

## 2023-09-06 DIAGNOSIS — E29.1 HYPOGONADISM IN MALE: ICD-10-CM

## 2023-09-08 ENCOUNTER — OFFICE VISIT (OUTPATIENT)
Dept: FAMILY MEDICINE CLINIC | Facility: CLINIC | Age: 43
End: 2023-09-08
Payer: COMMERCIAL

## 2023-09-08 VITALS
BODY MASS INDEX: 40.43 KG/M2 | HEIGHT: 74 IN | OXYGEN SATURATION: 94 % | WEIGHT: 315 LBS | HEART RATE: 76 BPM | TEMPERATURE: 97.1 F | SYSTOLIC BLOOD PRESSURE: 102 MMHG | DIASTOLIC BLOOD PRESSURE: 60 MMHG | RESPIRATION RATE: 18 BRPM

## 2023-09-08 DIAGNOSIS — J30.9 ALLERGIC RHINITIS, UNSPECIFIED SEASONALITY, UNSPECIFIED TRIGGER: ICD-10-CM

## 2023-09-08 DIAGNOSIS — E11.65 TYPE 2 DIABETES MELLITUS WITH HYPERGLYCEMIA, WITHOUT LONG-TERM CURRENT USE OF INSULIN: Primary | ICD-10-CM

## 2023-09-08 DIAGNOSIS — R07.9 CHEST PAIN, UNSPECIFIED TYPE: ICD-10-CM

## 2023-09-08 DIAGNOSIS — G47.33 OBSTRUCTIVE SLEEP APNEA: ICD-10-CM

## 2023-09-08 DIAGNOSIS — E29.1 HYPOGONADISM IN MALE: ICD-10-CM

## 2023-09-08 PROCEDURE — 99214 OFFICE O/P EST MOD 30 MIN: CPT

## 2023-09-08 PROCEDURE — 93000 ELECTROCARDIOGRAM COMPLETE: CPT

## 2023-09-08 RX ORDER — NEEDLES, SAFETY 18GX1 1/2"
NEEDLE, DISPOSABLE MISCELLANEOUS
COMMUNITY
Start: 2023-07-13

## 2023-09-08 RX ORDER — TESTOSTERONE CYPIONATE 200 MG/ML
100 INJECTION, SOLUTION INTRAMUSCULAR
Qty: 4 ML | Refills: 2 | Status: SHIPPED | OUTPATIENT
Start: 2023-09-08

## 2023-09-08 RX ORDER — FLUTICASONE PROPIONATE 50 MCG
2 SPRAY, SUSPENSION (ML) NASAL DAILY
Qty: 18.2 ML | Refills: 2 | Status: SHIPPED | OUTPATIENT
Start: 2023-09-08

## 2023-09-08 RX ORDER — TESTOSTERONE CYPIONATE 200 MG/ML
100 INJECTION, SOLUTION INTRAMUSCULAR
Qty: 4 ML | Refills: 2 | Status: CANCELLED | OUTPATIENT
Start: 2023-09-08

## 2023-09-09 LAB — TESTOST SERPL-MCNC: 657 NG/DL (ref 264–916)

## 2023-09-27 ENCOUNTER — TELEPHONE (OUTPATIENT)
Dept: FAMILY MEDICINE CLINIC | Facility: CLINIC | Age: 43
End: 2023-09-27
Payer: COMMERCIAL

## 2023-09-27 RX ORDER — MELOXICAM 15 MG/1
15 TABLET ORAL DAILY
Qty: 30 TABLET | Refills: 0 | OUTPATIENT
Start: 2023-09-27

## 2023-09-27 NOTE — TELEPHONE ENCOUNTER
Patient wanting refill on meloxicam written by previous PCP do you want to take this medication over

## 2023-09-28 RX ORDER — MELOXICAM 15 MG/1
15 TABLET ORAL DAILY
Qty: 90 TABLET | Refills: 1 | Status: SHIPPED | OUTPATIENT
Start: 2023-09-28

## 2023-10-11 NOTE — PROGRESS NOTES
"[facility] Cardiology  Consultation H&P  Aguila Shen  1980  [address]  [place] [address]     VISIT DATE:  10/13/23    PCP: Danni Guevara, APRN  [address]  [facility] [address]    IDENTIFICATION: A 43 y.o. male  [employer] employee from [place]    PROBLEM LIST:  Chest pain  Hypertension  Dyslipidemia    8/23 117/83/37/64  Type 2 diabetes    8/23 A1c 6.7   Diagnosis 2021  Hypothyroidism  SHANE on CPAP, compliant        CC:  Chief Complaint   Patient presents with    Chest Pain       Allergies  Allergies   Allergen Reactions    Metformin Diarrhea       Current Medications    Current Outpatient Medications:     BD Eclipse Syringe 25G X 1\" 3 ML misc, USE TO INJECT 0.5 ML OF TESTOSTERONE ONCE EVERY 7 DAYS, Disp: , Rfl:     cyclobenzaprine (FLEXERIL) 10 MG tablet, TAKE 1 TABLET BY MOUTH 3 TIMES A DAY AS NEEDED FOR MUSCLE SPASMS., Disp: 90 tablet, Rfl: 0    fluticasone (FLONASE) 50 MCG/ACT nasal spray, 2 sprays into the nostril(s) as directed by provider Daily., Disp: 18.2 mL, Rfl: 2    Jardiance 25 MG tablet tablet, TAKE 1 TABLET BY MOUTH EVERY DAY, Disp: 30 tablet, Rfl: 5    levothyroxine (SYNTHROID, LEVOTHROID) 50 MCG tablet, Take 1 tablet by mouth Daily., Disp: 30 tablet, Rfl: 5    lisinopril (PRINIVIL,ZESTRIL) 5 MG tablet, TAKE 1 TABLET BY MOUTH EVERY DAY, Disp: 90 tablet, Rfl: 1    meloxicam (MOBIC) 15 MG tablet, Take 1 tablet by mouth Daily., Disp: 90 tablet, Rfl: 1    multivitamin with minerals tablet tablet, Take 1 tablet by mouth Daily., Disp: , Rfl:     rosuvastatin (CRESTOR) 5 MG tablet, TAKE 1 TABLET BY MOUTH EVERY DAY, Disp: 90 tablet, Rfl: 1    Syringe/Needle, Disp, 25G X 1\" 1 ML misc, Use to inject 0.5 ml of testosterone once every 7 days, Disp: 25 each, Rfl: 1    Testosterone Cypionate (DEPOTESTOTERONE CYPIONATE) 200 MG/ML injection, INJECT 0.5 ML INTO THE APPROPRIATE MUSCLE AS DIRECTED BY PRESCRIBER EVERY 7 (SEVEN) DAYS., Disp: 4 mL, Rfl: 2     History of Present " Illness   HPI  Aguila Shen is a 43 y.o. year old male with the above mentioned PMH who presents for consult from PETERSON Preston for evaluation of chest pain.  She reports a 6 months history of intermittent stabbing sharp left-sided chest pain 1-2 times a week lasting around 2 to 5 minutes.  There is no provocation from eating, exertion, palpation, position, or deep inspiration.  The episodes might be associated with stress.  There were no associated symptoms such as shortness of breath, nausea or palpitations.    Pt denies any dyspnea at rest, dyspnea on exertion, orthopnea, PND, palpitations, lower extremity edema, or claudication. Pt denies history of CHF, DVT, PE, MI, CVA, TIA, or rheumatic fever.       ROS  Review of Systems   Constitutional: Positive for malaise/fatigue.   Cardiovascular:  Positive for chest pain.   Respiratory:  Positive for snoring.    Musculoskeletal:  Positive for joint pain.   All other systems reviewed and are negative.      SOCIAL HX  Social History     Socioeconomic History    Marital status:    Tobacco Use    Smoking status: Never    Smokeless tobacco: Current     Types: Chew   Vaping Use    Vaping Use: Never used   Substance and Sexual Activity    Alcohol use: Yes     Alcohol/week: 1.0 standard drink of alcohol     Types: 1 Cans of beer per week     Comment: socially    Drug use: Never    Sexual activity: Yes     Partners: Female       FAMILY HX  Family History   Problem Relation Age of Onset    Hypertension Mother     Arthritis Mother     Hyperlipidemia Mother     Hypertension Father     Diabetes Father     Cancer Brother     Hypertension Maternal Grandmother     Diabetes Maternal Grandmother     Hypertension Maternal Grandfather     Diabetes Maternal Grandfather     Hypertension Paternal Grandmother     Diabetes Paternal Grandmother     Hypertension Paternal Grandfather     Diabetes Paternal Grandfather        Vitals:    10/13/23 0947   BP: 130/60   BP Location: Left  "arm   Patient Position: Sitting   Cuff Size: Adult   Pulse: 99   SpO2: 100%   Weight: (!) 164 kg (361 lb 9.6 oz)   Height: 188 cm (74\")     Body mass index is 46.43 kg/mý.     PHYSICAL EXAMINATION:  Constitutional:       Appearance: Not in distress.      Comments: BMI 46   Pulmonary:      Effort: Pulmonary effort is normal.      Breath sounds: Normal breath sounds. No wheezing. No rhonchi. No rales.   Chest:      Chest wall: Not tender to palpatation.   Cardiovascular:      PMI at left midclavicular line. Normal rate. Regular rhythm. Normal S1. Normal S2.       Murmurs: There is no murmur.      No gallop.  No click. No rub.   Pulses:     Intact distal pulses.   Edema:     Peripheral edema absent.   Skin:     General: Skin is warm and dry.   Neurological:      General: No focal deficit present.      Mental Status: Alert and oriented to person, place and time.         Diagnostic Data:  Procedures  Lab Results   Component Value Date    TRIG 83 08/24/2023    HDL 37 (L) 08/24/2023     Lab Results   Component Value Date    GLUCOSE 104 (H) 08/24/2023    BUN 13 08/24/2023    CREATININE 0.91 08/24/2023     08/24/2023    K 4.4 08/24/2023     08/24/2023    CO2 26.1 08/24/2023     Lab Results   Component Value Date    HGBA1C 6.70 (H) 08/24/2023     Lab Results   Component Value Date    WBC 7.42 08/24/2023    HGB 16.0 08/24/2023    HCT 47.8 08/24/2023     08/24/2023     EKG 9/8/2023: Sinus rhythm with nonspecific inferior T waves    Advance Care Planning   ACP discussion was declined by the patient. Patient does not have an advance directive, information provided.         ASSESSMENT:   Diagnosis Plan   1. Chest pain, atypical  CT Angiogram Coronary      2. Essential hypertension  CT Angiogram Coronary      3. Dyslipidemia  CT Angiogram Coronary      4. Type 2 diabetes mellitus with hyperglycemia, without long-term current use of insulin  CT Angiogram Coronary      5. Class 3 severe obesity due to excess " calories with body mass index (BMI) of 45.0 to 49.9 in adult, unspecified whether serious comorbidity present        6. SHANE (obstructive sleep apnea)        7. Tobacco use  CT Angiogram Coronary          PLAN:  Atypical chest pain: Intermittent brief episodes of stabbing pains without obvious provocation.  Positive risk factors hypertension, dyslipidemia, obesity, type 2 diabetes and tobacco use.  -Coronary CTA    Hypertension-controlled in office today at 130/60.  Continue current antihypertensive regimen    Dyslipidemia controlled on Crestor 5    Obesity-seeing a nutritionist discussed introducing hide 50 minutes or greater physical exercise per week. Discussed dietary recs in detail including portion control, avoidance of carbohydrate rich foods, avoidance of drinks with calories, and avoidance of snacking after dinner.    Diabetes-controlled on Jardiance alone, A1c 6.7    SHANE-reports compliance on CPAP    Tobacco use-uses G-tube daily at work  Aguila Shen  reports that he has never smoked. His smokeless tobacco use includes chew.. I have educated him on the risk of diseases from using tobacco products such as cancer, COPD, and heart disease.     I advised him to quit and he is not willing to quit.    I spent 5 minutes counseling the patient.            PETERSON Preston, thank you for referring Mr. Shen for evaluation.  I have forwarded my electronically generated recommendations to you for review.  Please do not hesitate to call with any questions.    Scribed by Bg Greenwood PA-C for:    Seamus Neely MD, [facility]

## 2023-10-13 ENCOUNTER — OFFICE VISIT (OUTPATIENT)
Dept: CARDIOLOGY | Facility: CLINIC | Age: 43
End: 2023-10-13
Payer: COMMERCIAL

## 2023-10-13 VITALS
HEART RATE: 99 BPM | HEIGHT: 74 IN | SYSTOLIC BLOOD PRESSURE: 130 MMHG | DIASTOLIC BLOOD PRESSURE: 60 MMHG | WEIGHT: 315 LBS | BODY MASS INDEX: 40.43 KG/M2 | OXYGEN SATURATION: 100 %

## 2023-10-13 DIAGNOSIS — E11.65 TYPE 2 DIABETES MELLITUS WITH HYPERGLYCEMIA, WITHOUT LONG-TERM CURRENT USE OF INSULIN: ICD-10-CM

## 2023-10-13 DIAGNOSIS — G47.33 OSA (OBSTRUCTIVE SLEEP APNEA): ICD-10-CM

## 2023-10-13 DIAGNOSIS — I10 ESSENTIAL HYPERTENSION: ICD-10-CM

## 2023-10-13 DIAGNOSIS — R07.89 CHEST PAIN, ATYPICAL: Primary | ICD-10-CM

## 2023-10-13 DIAGNOSIS — Z72.0 TOBACCO USE: ICD-10-CM

## 2023-10-13 DIAGNOSIS — E78.5 DYSLIPIDEMIA: ICD-10-CM

## 2023-10-13 DIAGNOSIS — E66.01 CLASS 3 SEVERE OBESITY DUE TO EXCESS CALORIES WITH BODY MASS INDEX (BMI) OF 45.0 TO 49.9 IN ADULT, UNSPECIFIED WHETHER SERIOUS COMORBIDITY PRESENT: ICD-10-CM

## 2023-10-25 ENCOUNTER — PATIENT MESSAGE (OUTPATIENT)
Dept: FAMILY MEDICINE CLINIC | Facility: CLINIC | Age: 43
End: 2023-10-25
Payer: COMMERCIAL

## 2023-10-25 NOTE — TELEPHONE ENCOUNTER
From: Aguila Shen  To: Danni Guevara  Sent: 10/25/2023 10:54 AM EDT  Subject: CPAP supplies     Ok so I know we've spoken about me getting supplies before and you asked if the office and everyone said [employer]. I've been trying to deal with them now to get supplies and they've somehow lost everything, the order, the democratic's, and the copy of the sleep study. Could you please resend that stuff to them before I go crazy with them. If you need anything from me please let me know. I know my sleep study was done with [facility] so I'm not sure if you got a copy from them or what. Thank you so much

## 2023-11-20 RX ORDER — LEVOTHYROXINE SODIUM 0.05 MG/1
50 TABLET ORAL DAILY
Qty: 90 TABLET | Refills: 1 | Status: SHIPPED | OUTPATIENT
Start: 2023-11-20

## 2023-11-30 DIAGNOSIS — J30.9 ALLERGIC RHINITIS, UNSPECIFIED SEASONALITY, UNSPECIFIED TRIGGER: ICD-10-CM

## 2023-11-30 RX ORDER — FLUTICASONE PROPIONATE 50 MCG
SPRAY, SUSPENSION (ML) NASAL
Qty: 48 ML | Refills: 0 | Status: SHIPPED | OUTPATIENT
Start: 2023-11-30

## 2023-12-08 ENCOUNTER — OFFICE VISIT (OUTPATIENT)
Dept: FAMILY MEDICINE CLINIC | Facility: CLINIC | Age: 43
End: 2023-12-08
Payer: COMMERCIAL

## 2023-12-08 VITALS
HEART RATE: 70 BPM | SYSTOLIC BLOOD PRESSURE: 122 MMHG | RESPIRATION RATE: 18 BRPM | TEMPERATURE: 98 F | HEIGHT: 74 IN | OXYGEN SATURATION: 98 % | BODY MASS INDEX: 40.43 KG/M2 | WEIGHT: 315 LBS | DIASTOLIC BLOOD PRESSURE: 72 MMHG

## 2023-12-08 DIAGNOSIS — Z23 NEED FOR INFLUENZA VACCINATION: ICD-10-CM

## 2023-12-08 DIAGNOSIS — E11.65 TYPE 2 DIABETES MELLITUS WITH HYPERGLYCEMIA, WITHOUT LONG-TERM CURRENT USE OF INSULIN: Primary | ICD-10-CM

## 2023-12-08 DIAGNOSIS — E66.01 CLASS 3 SEVERE OBESITY DUE TO EXCESS CALORIES WITH BODY MASS INDEX (BMI) OF 45.0 TO 49.9 IN ADULT, UNSPECIFIED WHETHER SERIOUS COMORBIDITY PRESENT: ICD-10-CM

## 2023-12-08 DIAGNOSIS — Z12.11 ENCOUNTER FOR SCREENING COLONOSCOPY: ICD-10-CM

## 2023-12-08 DIAGNOSIS — E29.1 HYPOGONADISM IN MALE: ICD-10-CM

## 2023-12-08 LAB
EXPIRATION DATE: ABNORMAL
EXPIRATION DATE: NORMAL
HBA1C MFR BLD: 6.6 % (ref 4.5–5.7)
Lab: ABNORMAL
Lab: NORMAL
POC CREATININE URINE: NORMAL
POC MICROALBUMIN URINE: NORMAL

## 2023-12-08 NOTE — PROGRESS NOTES
"Chief Complaint   Patient presents with    Follow-up     3 month follow up-open care gaps pt wants flu vaccine       Subjective      Aguila Shen is a 43 y.o. who presents for follow up on diabetes.  No concerns.    Has an appointment next Friday for cardiology / CT. Has not been checking sugars.    Sleep apnea - going okay with machine and supplies.    Testosterone injections going well. No complaints.   Had colonoscopy a few years ago.     The following portions of the patient's history were reviewed and updated as appropriate: allergies, current medications, past family history, past medical history, past social history, past surgical history, and problem list.    Review of Systems   Constitutional:  Negative for chills and fever.   Respiratory:  Negative for chest tightness and shortness of breath.    Cardiovascular:  Negative for palpitations and leg swelling.   Neurological:  Negative for dizziness.   All other systems reviewed and are negative.      Objective   Vital Signs:  /72   Pulse 70   Temp 98 °F (36.7 °C)   Resp 18   Ht 188 cm (74\")   Wt (!) 165 kg (364 lb 8 oz)   SpO2 98%   BMI 46.80 kg/m²               Physical Exam  Vitals and nursing note reviewed.   Constitutional:       Appearance: Normal appearance.   Cardiovascular:      Rate and Rhythm: Normal rate and regular rhythm.      Heart sounds: Normal heart sounds.   Pulmonary:      Breath sounds: Normal breath sounds.   Musculoskeletal:      Right lower leg: No edema.      Left lower leg: No edema.   Neurological:      General: No focal deficit present.      Mental Status: He is alert and oriented to person, place, and time.   Psychiatric:         Mood and Affect: Mood normal.         Behavior: Behavior normal.          Result Review                     Assessment and Plan  Diagnoses and all orders for this visit:    1. Type 2 diabetes mellitus with hyperglycemia, without long-term current use of insulin (Primary)  -     POC Glycosylated " Hemoglobin (Hb A1C)  -     POC Microalbumin    2. Class 3 severe obesity due to excess calories with body mass index (BMI) of 45.0 to 49.9 in adult, unspecified whether serious comorbidity present    3. Hypogonadism in male  -     CBC & Differential  -     Testosterone    4. Need for influenza vaccination  -     Fluzone >6 Months ([id number])    5. Encounter for screening colonoscopy  -     Ambulatory Referral For Screening Colonoscopy      Diabetes - 6.6%, slightly improved, well controlled with current regimen.  Recommend continuing current medication.   Continue testosterone replacement - CBC and testosterone level today.    Patient counseled on importance of colorectal cancer screening and the options for screening and chooses screening colonoscopy, order placed.   Influenza immunization today. Patient was counseled regarding risks/benefits and chooses to proceed with immunization.   Follow up in 3 months, sooner if needed.         Discussed medications prescribed and OTC medications recommended.  Discussed medication safety, possible side effects and how to take or administer medications. Instructed patient to report any adverse reactions, side effects or concerns.     Reviewed physical exam findings and plan with patient who verbalized understanding and agrees with plan of care. Patient was given opportunity to ask questions and all concerns were addressed prior to the conclusion of today's visit.     Follow Up  Return in about 3 months (around 3/8/2024) for Recheck diabetes .  Patient was given instructions and counseling regarding his condition or for health maintenance advice. Please see specific information pulled into the AVS if appropriate.     Note to patient: The 21st Century Cures Act makes medical notes like these available to patients in the interest of transparency. However, be advised this is a medical document. It is intended as peer to peer communication. It is written in medical language and may  contain abbreviations or verbiage that are unfamiliar. It may appear blunt or direct. Medical documents are intended to carry relevant information, facts as evident, and the clinical opinion of the provider.

## 2023-12-09 LAB
BASOPHILS # BLD AUTO: 0 X10E3/UL (ref 0–0.2)
BASOPHILS NFR BLD AUTO: 0 %
EOSINOPHIL # BLD AUTO: 0.1 X10E3/UL (ref 0–0.4)
EOSINOPHIL NFR BLD AUTO: 1 %
ERYTHROCYTE [DISTWIDTH] IN BLOOD BY AUTOMATED COUNT: 14.5 % (ref 11.6–15.4)
HCT VFR BLD AUTO: 49.3 % (ref 37.5–51)
HGB BLD-MCNC: 16.7 G/DL (ref 13–17.7)
IMM GRANULOCYTES # BLD AUTO: 0 X10E3/UL (ref 0–0.1)
IMM GRANULOCYTES NFR BLD AUTO: 0 %
LYMPHOCYTES # BLD AUTO: 2.3 X10E3/UL (ref 0.7–3.1)
LYMPHOCYTES NFR BLD AUTO: 30 %
MCH RBC QN AUTO: 30.5 PG (ref 26.6–33)
MCHC RBC AUTO-ENTMCNC: 33.9 G/DL (ref 31.5–35.7)
MCV RBC AUTO: 90 FL (ref 79–97)
MONOCYTES # BLD AUTO: 0.6 X10E3/UL (ref 0.1–0.9)
MONOCYTES NFR BLD AUTO: 7 %
NEUTROPHILS # BLD AUTO: 4.6 X10E3/UL (ref 1.4–7)
NEUTROPHILS NFR BLD AUTO: 62 %
PLATELET # BLD AUTO: 285 X10E3/UL (ref 150–450)
RBC # BLD AUTO: 5.47 X10E6/UL (ref 4.14–5.8)
TESTOST SERPL-MCNC: 913 NG/DL (ref 264–916)
WBC # BLD AUTO: 7.6 X10E3/UL (ref 3.4–10.8)

## 2023-12-10 RX ORDER — TESTOSTERONE CYPIONATE 200 MG/ML
100 INJECTION, SOLUTION INTRAMUSCULAR
Qty: 2 ML | Refills: 2 | Status: SHIPPED | OUTPATIENT
Start: 2023-12-10

## 2023-12-10 NOTE — TELEPHONE ENCOUNTER
Yes, back replacement down to every 2 weeks instead of weekly, to prevent from getting too elevated.

## 2023-12-12 ENCOUNTER — TELEPHONE (OUTPATIENT)
Dept: FAMILY MEDICINE CLINIC | Facility: CLINIC | Age: 43
End: 2023-12-12
Payer: COMMERCIAL

## 2023-12-12 NOTE — TELEPHONE ENCOUNTER
Caller: Aguila Shen    Relationship: Self    Best call back number: [phone]     What was the call regarding: PATIENT MISSED A CALL FROM THE OFFICE. HE IS NOT SURE WHO CALLED OR WHAT IT WAS ABOUT BUT HE WOULD LIKE ANOTHER CALL BACK.

## 2023-12-13 ENCOUNTER — TELEPHONE (OUTPATIENT)
Dept: INFUSION THERAPY | Facility: HOSPITAL | Age: 43
End: 2023-12-13
Payer: COMMERCIAL

## 2023-12-13 ENCOUNTER — TELEPHONE (OUTPATIENT)
Dept: FAMILY MEDICINE CLINIC | Facility: CLINIC | Age: 43
End: 2023-12-13
Payer: COMMERCIAL

## 2023-12-13 DIAGNOSIS — E29.1 HYPOGONADISM IN MALE: Primary | ICD-10-CM

## 2023-12-13 RX ORDER — CHLORAL HYDRATE 500 MG
1000 CAPSULE ORAL
COMMUNITY

## 2023-12-13 NOTE — TELEPHONE ENCOUNTER
Pt called and is having issues with the testosterone levels and medication, His levels fluctuate too much by the second week, causing mood swings. Wants to recheck his levels before his next injection to show what his levels truly are, so he can get the right dosing on his medication, please advise asap, Hopefully no longer than 1 month, he does not see the need to start back on going to a Urologist.

## 2023-12-13 NOTE — TELEPHONE ENCOUNTER
Spoke with physician. We can repeat labs right before his next injection is due (in two weeks). I'll place the order

## 2023-12-13 NOTE — TELEPHONE ENCOUNTER
Pt contacted as pre-procedure phone call prior to planned CTA coronary for 12/15/23. Reviewed with patient arrival time, location, nothing to eat or drink by mouth 4 hours prior to CT, no caffeine the day of CT,  okay to take morning medications the day of procedure with a small sip of water,  needed, reviewed procedure instructions and allowed time for questions, and reviewed home medications, allergies, and medical history.

## 2023-12-15 ENCOUNTER — HOSPITAL ENCOUNTER (OUTPATIENT)
Dept: CT IMAGING | Facility: HOSPITAL | Age: 43
Discharge: HOME | End: 2023-12-15
Payer: COMMERCIAL

## 2023-12-15 VITALS
HEART RATE: 63 BPM | RESPIRATION RATE: 18 BRPM | TEMPERATURE: 97.6 F | BODY MASS INDEX: 40.43 KG/M2 | HEIGHT: 74 IN | SYSTOLIC BLOOD PRESSURE: 117 MMHG | DIASTOLIC BLOOD PRESSURE: 68 MMHG | WEIGHT: 315 LBS | OXYGEN SATURATION: 95 %

## 2023-12-15 DIAGNOSIS — Z72.0 TOBACCO USE: ICD-10-CM

## 2023-12-15 DIAGNOSIS — E78.5 DYSLIPIDEMIA: ICD-10-CM

## 2023-12-15 DIAGNOSIS — I10 ESSENTIAL HYPERTENSION: ICD-10-CM

## 2023-12-15 DIAGNOSIS — E11.65 TYPE 2 DIABETES MELLITUS WITH HYPERGLYCEMIA, WITHOUT LONG-TERM CURRENT USE OF INSULIN: ICD-10-CM

## 2023-12-15 DIAGNOSIS — R07.89 CHEST PAIN, ATYPICAL: ICD-10-CM

## 2023-12-15 LAB — GLUCOSE BLDC GLUCOMTR-MCNC: 97 MG/DL (ref 70–130)

## 2023-12-15 PROCEDURE — 25510000001 IOPAMIDOL PER 1 ML

## 2023-12-15 PROCEDURE — 82948 REAGENT STRIP/BLOOD GLUCOSE: CPT

## 2023-12-15 PROCEDURE — 75574 CT ANGIO HRT W/3D IMAGE: CPT

## 2023-12-15 RX ORDER — METOPROLOL TARTRATE 100 MG/1
100 TABLET ORAL ONCE
Status: COMPLETED | OUTPATIENT
Start: 2023-12-15 | End: 2023-12-15

## 2023-12-15 RX ORDER — METOPROLOL TARTRATE 50 MG/1
50 TABLET, FILM COATED ORAL
Status: DISCONTINUED | OUTPATIENT
Start: 2023-12-15 | End: 2023-12-16 | Stop reason: HOSPADM

## 2023-12-15 RX ORDER — NITROGLYCERIN 400 UG/1
SPRAY ORAL
Status: COMPLETED
Start: 2023-12-15 | End: 2023-12-15

## 2023-12-15 RX ORDER — NITROGLYCERIN 400 UG/1
2 SPRAY ORAL
Status: COMPLETED | OUTPATIENT
Start: 2023-12-15 | End: 2023-12-15

## 2023-12-15 RX ORDER — SODIUM CHLORIDE 0.9 % (FLUSH) 0.9 %
10 SYRINGE (ML) INJECTION AS NEEDED
Status: DISCONTINUED | OUTPATIENT
Start: 2023-12-15 | End: 2023-12-16 | Stop reason: HOSPADM

## 2023-12-15 RX ORDER — METOPROLOL TARTRATE 1 MG/ML
5 INJECTION, SOLUTION INTRAVENOUS
Status: DISCONTINUED | OUTPATIENT
Start: 2023-12-15 | End: 2023-12-16 | Stop reason: HOSPADM

## 2023-12-15 RX ORDER — NITROGLYCERIN 400 UG/1
1 SPRAY ORAL
Status: COMPLETED | OUTPATIENT
Start: 2023-12-15 | End: 2023-12-15

## 2023-12-15 RX ORDER — METOPROLOL TARTRATE 50 MG/1
50 TABLET, FILM COATED ORAL ONCE
Status: COMPLETED | OUTPATIENT
Start: 2023-12-15 | End: 2023-12-15

## 2023-12-15 RX ORDER — METOPROLOL TARTRATE 100 MG/1
200 TABLET ORAL ONCE
Status: COMPLETED | OUTPATIENT
Start: 2023-12-15 | End: 2023-12-15

## 2023-12-15 RX ORDER — LIDOCAINE HYDROCHLORIDE 10 MG/ML
0.5 INJECTION, SOLUTION EPIDURAL; INFILTRATION; INTRACAUDAL; PERINEURAL ONCE AS NEEDED
Status: DISCONTINUED | OUTPATIENT
Start: 2023-12-15 | End: 2023-12-16 | Stop reason: HOSPADM

## 2023-12-15 RX ADMIN — NITROGLYCERIN 2 SPRAY: 400 SPRAY ORAL at 15:06

## 2023-12-15 RX ADMIN — METOPROLOL TARTRATE 200 MG: 100 TABLET, FILM COATED ORAL at 12:24

## 2023-12-15 RX ADMIN — METOPROLOL TARTRATE 50 MG: 50 TABLET ORAL at 14:20

## 2023-12-15 RX ADMIN — METOPROLOL TARTRATE 50 MG: 50 TABLET ORAL at 13:31

## 2023-12-15 RX ADMIN — IOPAMIDOL 65 ML: 755 INJECTION, SOLUTION INTRAVENOUS at 15:13

## 2023-12-26 ENCOUNTER — LAB (OUTPATIENT)
Dept: FAMILY MEDICINE CLINIC | Facility: CLINIC | Age: 43
End: 2023-12-26
Payer: COMMERCIAL

## 2024-01-16 ENCOUNTER — TELEPHONE (OUTPATIENT)
Dept: CARDIOLOGY | Facility: CLINIC | Age: 44
End: 2024-01-16
Payer: COMMERCIAL

## 2024-01-16 DIAGNOSIS — R07.89 CHEST PAIN, ATYPICAL: Primary | ICD-10-CM

## 2024-01-16 LAB — CREAT BLDA-MCNC: 0.9 MG/DL (ref 0.6–1.3)

## 2024-01-16 NOTE — TELEPHONE ENCOUNTER
"Patient states he continues to have intermittent episodes of chest pain. The pain has continued since his visit on 10/13/23. A coronary CTA was ordered with the following results:      Normal coronary CTA with coronary calcium score of 0 and no evidence of significant atherosclerotic plaque.     His appointment isn't until 1/17/25 and he would like to know what Dr. Neely's recommendations are.     Episodes are as reported in the visit note. \"Stabbing\" in nature on the left side that last around 2 minutes. Occurs 1-2 times weekly. 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 No other symptoms. Doesn't necessarily happen with movement.    Please advise.  "

## 2024-01-16 NOTE — TELEPHONE ENCOUNTER
"  Caller: Aguila Shen \"Lc\"    Relationship: Self    Best call back number: [phone]    What is the best time to reach you: ANY     Who are you requesting to speak with (clinical staff, provider,  specific staff member): CLINICAL     What was the call regarding: PT STATES THAT HE HAD A CT SCAN DONE RECENTLY, AND HIS FOLLOW UP APPT FOR THAT WAS SCHEDULED FOR JANUARY OF 2025. PT IS INQUIRING IS THIS WAS INTENTIONAL. PLEASE REACH OUT TO FURTHER ADVISE THANK You!     Is it okay if the provider responds through [employer]: PLEASE CALL            "

## 2024-01-18 NOTE — TELEPHONE ENCOUNTER
Per Dr. Neely:  Can issue a 1 week holter  May want to try OTC antacid to see if GI etiology?  But CTA heart and chest very benign       Notified patient and he verbalized understanding. Order for monitor placed.

## 2024-01-19 DIAGNOSIS — E29.1 HYPOGONADISM IN MALE: ICD-10-CM

## 2024-01-19 RX ORDER — EMPAGLIFLOZIN 25 MG/1
25 TABLET, FILM COATED ORAL DAILY
Qty: 30 TABLET | Refills: 5 | OUTPATIENT
Start: 2024-01-19

## 2024-01-19 RX ORDER — EMPAGLIFLOZIN 25 MG/1
25 TABLET, FILM COATED ORAL DAILY
Qty: 30 TABLET | Refills: 5 | Status: SHIPPED | OUTPATIENT
Start: 2024-01-19

## 2024-01-19 RX ORDER — TESTOSTERONE CYPIONATE 200 MG/ML
100 INJECTION, SOLUTION INTRAMUSCULAR
Qty: 3 ML | Refills: 1 | Status: SHIPPED | OUTPATIENT
Start: 2024-01-19

## 2024-01-19 NOTE — TELEPHONE ENCOUNTER
"    Caller: Aguila Shen \"Lc\"    Relationship: Self    Best call back number: [phone]    Requested Prescriptions:   Requested Prescriptions     Pending Prescriptions Disp Refills    Jardiance 25 MG tablet tablet 30 tablet 5     Sig: Take 1 tablet by mouth Daily.        Pharmacy where request should be sent: [facility]/PHARMACY #[id number] [address] AT [place] [address] - [phone] [facility] [phone] FX     Last office visit with prescribing clinician: 12/8/2023   Last telemedicine visit with prescribing clinician: Visit date not found   Next office visit with prescribing clinician: 3/8/2024     Additional details provided by patient: THE PATIENT TOOK LAST PILL TODAY     Does the patient have less than a 3 day supply:  [x] Yes  [] No    Would you like a call back once the refill request has been completed: [] Yes [x] No    If the office needs to give you a call back, can they leave a voicemail: [] Yes [x] No    Andrew Ocampo Rep   01/19/24 09:23 EST         "

## 2024-02-05 RX ORDER — SODIUM, POTASSIUM,MAG SULFATES 17.5-3.13G
SOLUTION, RECONSTITUTED, ORAL ORAL
Qty: 354 ML | Refills: 0 | Status: SHIPPED | OUTPATIENT
Start: 2024-02-05

## 2024-02-16 ENCOUNTER — OUTSIDE FACILITY SERVICE (OUTPATIENT)
Dept: GASTROENTEROLOGY | Facility: CLINIC | Age: 44
End: 2024-02-16
Payer: COMMERCIAL

## 2024-02-16 PROCEDURE — 45378 DIAGNOSTIC COLONOSCOPY: CPT | Performed by: INTERNAL MEDICINE

## 2024-02-16 RX ORDER — MELOXICAM 15 MG/1
15 TABLET ORAL DAILY
Qty: 90 TABLET | Refills: 1 | Status: SHIPPED | OUTPATIENT
Start: 2024-02-16

## 2024-02-20 ENCOUNTER — TELEPHONE (OUTPATIENT)
Dept: FAMILY MEDICINE CLINIC | Facility: CLINIC | Age: 44
End: 2024-02-20
Payer: COMMERCIAL

## 2024-02-20 DIAGNOSIS — E29.1 HYPOGONADISM IN MALE: Primary | ICD-10-CM

## 2024-02-20 NOTE — TELEPHONE ENCOUNTER
"----- Message from Odalis Nino MA sent at 2/19/2024  2:57 PM EST -----  Regarding: FW: Testosterone   Contact: [phone]  Labs ?  ----- Message -----  From: Aguila Shen \"Lc\"  Sent: 2/18/2024   1:42 PM EST  To: [facility]  Subject: Testosterone                                     So tomorrow is the day I'm due for my next shot, I'm not sure if your office is open or not but I'd like to draw labs for my levels please. I really don't like the roller-coaster I'm on with going longer in between shots. I get tired more often, santillan, and losing some performance in the bedroom so to speak. If this is a option please let me know.  Thank you    "

## 2024-02-20 NOTE — TELEPHONE ENCOUNTER
Let patient know I am off on Monday and did not see message until today. I am happy to order labs for before his next injection.

## 2024-02-22 RX ORDER — LISINOPRIL 5 MG/1
TABLET ORAL
Qty: 90 TABLET | Refills: 1 | Status: SHIPPED | OUTPATIENT
Start: 2024-02-22

## 2024-02-22 RX ORDER — ROSUVASTATIN CALCIUM 5 MG/1
TABLET, COATED ORAL
Qty: 90 TABLET | Refills: 1 | Status: SHIPPED | OUTPATIENT
Start: 2024-02-22

## 2024-02-22 NOTE — TELEPHONE ENCOUNTER
Rx Refill Note  Requested Prescriptions     Pending Prescriptions Disp Refills    rosuvastatin (CRESTOR) 5 MG tablet [Pharmacy Med Name: ROSUVASTATIN CALCIUM 5 MG TAB] 90 tablet 1     Sig: TAKE 1 TABLET BY MOUTH EVERY DAY    lisinopril (PRINIVIL,ZESTRIL) 5 MG tablet [Pharmacy Med Name: LISINOPRIL 5 MG TABLET] 90 tablet 1     Sig: TAKE 1 TABLET BY MOUTH EVERY DAY      Last office visit with prescribing clinician: 12/8/2023   Last telemedicine visit with prescribing clinician: Visit date not found   Next office visit with prescribing clinician: 3/15/2024                         Would you like a call back once the refill request has been completed: [] Yes [] No    If the office needs to give you a call back, can they leave a voicemail: [] Yes [] No    Heydi Garces MA  02/22/24, 07:59 EST

## 2024-02-29 ENCOUNTER — LAB (OUTPATIENT)
Dept: FAMILY MEDICINE CLINIC | Facility: CLINIC | Age: 44
End: 2024-02-29
Payer: COMMERCIAL

## 2024-03-04 DIAGNOSIS — E29.1 HYPOGONADISM IN MALE: ICD-10-CM

## 2024-03-04 RX ORDER — TESTOSTERONE CYPIONATE 200 MG/ML
100 INJECTION, SOLUTION INTRAMUSCULAR
Qty: 4 ML | Refills: 2 | Status: SHIPPED | OUTPATIENT
Start: 2024-03-04

## 2024-03-06 DIAGNOSIS — J30.9 ALLERGIC RHINITIS, UNSPECIFIED SEASONALITY, UNSPECIFIED TRIGGER: ICD-10-CM

## 2024-03-06 RX ORDER — FLUTICASONE PROPIONATE 50 MCG
2 SPRAY, SUSPENSION (ML) NASAL DAILY
Qty: 48 ML | Refills: 0 | Status: SHIPPED | OUTPATIENT
Start: 2024-03-06

## 2024-03-15 ENCOUNTER — OFFICE VISIT (OUTPATIENT)
Dept: FAMILY MEDICINE CLINIC | Facility: CLINIC | Age: 44
End: 2024-03-15
Payer: COMMERCIAL

## 2024-03-15 VITALS
HEIGHT: 74 IN | HEART RATE: 79 BPM | WEIGHT: 315 LBS | BODY MASS INDEX: 40.43 KG/M2 | DIASTOLIC BLOOD PRESSURE: 78 MMHG | RESPIRATION RATE: 16 BRPM | SYSTOLIC BLOOD PRESSURE: 128 MMHG | OXYGEN SATURATION: 98 % | TEMPERATURE: 97.8 F

## 2024-03-15 DIAGNOSIS — M62.838 MUSCLE SPASM: ICD-10-CM

## 2024-03-15 DIAGNOSIS — E11.65 TYPE 2 DIABETES MELLITUS WITH HYPERGLYCEMIA, WITHOUT LONG-TERM CURRENT USE OF INSULIN: Primary | ICD-10-CM

## 2024-03-15 DIAGNOSIS — E29.1 HYPOGONADISM IN MALE: ICD-10-CM

## 2024-03-15 DIAGNOSIS — J01.00 ACUTE NON-RECURRENT MAXILLARY SINUSITIS: ICD-10-CM

## 2024-03-15 LAB — HBA1C MFR BLD: 6.7 % (ref 4.8–5.6)

## 2024-03-15 PROCEDURE — 99214 OFFICE O/P EST MOD 30 MIN: CPT

## 2024-03-15 RX ORDER — CYCLOBENZAPRINE HCL 10 MG
10 TABLET ORAL 3 TIMES DAILY PRN
Qty: 90 TABLET | Refills: 0 | Status: SHIPPED | OUTPATIENT
Start: 2024-03-15

## 2024-03-15 RX ORDER — PSEUDOEPHEDRINE HCL 30 MG
30 TABLET ORAL EVERY 4 HOURS PRN
COMMUNITY

## 2024-03-15 RX ORDER — AMOXICILLIN AND CLAVULANATE POTASSIUM 875; 125 MG/1; MG/1
1 TABLET, FILM COATED ORAL 2 TIMES DAILY
Qty: 14 TABLET | Refills: 0 | Status: SHIPPED | OUTPATIENT
Start: 2024-03-15

## 2024-03-15 NOTE — PROGRESS NOTES
"Chief Complaint   Patient presents with    Follow-up    Diabetes       Subjective      Aguila Shen is a 43 y.o. who presents for follow up on diabetes, hypogonadism, HTN.   Blood pressure at goal today.   Diabetes - not checking sugars, but A1C is controlled and stable at 6.7%.   Testosterone - has switched back to weekly injections.  Going well. No concerns.    Needs refill of cyclobenzaprine for PRN for muscle spasms of back.   Has had sinus congestion for the last two weeks and pain for the last 3-4 days to maxillary sinuses.        The following portions of the patient's history were reviewed and updated as appropriate: allergies, current medications, past family history, past medical history, past social history, past surgical history, and problem list.    Review of Systems   Constitutional:  Negative for chills and fever.   HENT:  Positive for congestion, rhinorrhea and sinus pressure.    Eyes:  Negative for blurred vision.   Respiratory:  Negative for chest tightness and shortness of breath.    Cardiovascular:  Negative for chest pain.   Endocrine: Negative for polydipsia, polyphagia and polyuria.   Neurological:  Negative for dizziness.       Objective   Vital Signs:  /78   Pulse 79   Temp 97.8 °F (36.6 °C)   Resp 16   Ht 188 cm (74\")   Wt (!) 164 kg (362 lb)   SpO2 98%   BMI 46.48 kg/m²               Physical Exam  Vitals and nursing note reviewed.   Constitutional:       Appearance: Normal appearance.   HENT:      Right Ear: Tympanic membrane, ear canal and external ear normal.      Left Ear: Tympanic membrane, ear canal and external ear normal.      Nose:      Right Sinus: Maxillary sinus tenderness present. No frontal sinus tenderness.      Left Sinus: Maxillary sinus tenderness present. No frontal sinus tenderness.      Mouth/Throat:      Mouth: Mucous membranes are moist.      Pharynx: No posterior oropharyngeal erythema.   Cardiovascular:      Rate and Rhythm: Normal rate and regular " rhythm.      Heart sounds: Normal heart sounds.   Pulmonary:      Breath sounds: Normal breath sounds.   Neurological:      Mental Status: He is alert.          Result Review                     Assessment and Plan  Diagnoses and all orders for this visit:    1. Type 2 diabetes mellitus with hyperglycemia, without long-term current use of insulin (Primary)  -     Cancel: POC Glycosylated Hemoglobin (Hb A1C)  -     Cancel: Hemoglobin A1c  -     Hemoglobin A1c    2. Muscle spasm  -     cyclobenzaprine (FLEXERIL) 10 MG tablet; Take 1 tablet by mouth 3 (Three) Times a Day As Needed for Muscle Spasms.  Dispense: 90 tablet; Refill: 0    3. Acute non-recurrent maxillary sinusitis  -     amoxicillin-clavulanate (AUGMENTIN) 875-125 MG per tablet; Take 1 tablet by mouth 2 (Two) Times a Day.  Dispense: 14 tablet; Refill: 0    4. Hypogonadism in male      Continue medications as prescribed. Diabetes is controlled.    Flexeril refill as prescribed Prn.   Antibiotic as prescribed for maxillary sinusitis, follow up if no improvement or worsening, take with food.   Continue testosterone injections as prescribed.   Plan to recheck thyroid at follow up  Follow up in 3 months        Discussed medications prescribed and OTC medications recommended.  Discussed medication safety, possible side effects and how to take or administer medications. Instructed patient to report any adverse reactions, side effects or concerns.     Reviewed physical exam findings and plan with patient who verbalized understanding and agrees with plan of care. Patient was given opportunity to ask questions and all concerns were addressed prior to the conclusion of today's visit.     Follow Up  Return in about 3 months (around 6/15/2024) for Recheck testosterone / diabetes .  Patient was given instructions and counseling regarding his condition or for health maintenance advice. Please see specific information pulled into the AVS if appropriate.     Note to  patient: The 21st Century Cures Act makes medical notes like these available to patients in the interest of transparency. However, be advised this is a medical document. It is intended as peer to peer communication. It is written in medical language and may contain abbreviations or verbiage that are unfamiliar. It may appear blunt or direct. Medical documents are intended to carry relevant information, facts as evident, and the clinical opinion of the provider.

## 2024-04-02 ENCOUNTER — TELEPHONE (OUTPATIENT)
Dept: FAMILY MEDICINE CLINIC | Facility: CLINIC | Age: 44
End: 2024-04-02
Payer: COMMERCIAL

## 2024-04-04 DIAGNOSIS — M62.838 MUSCLE SPASM: ICD-10-CM

## 2024-04-04 RX ORDER — CYCLOBENZAPRINE HCL 10 MG
10 TABLET ORAL 3 TIMES DAILY PRN
Qty: 90 TABLET | Refills: 0 | OUTPATIENT
Start: 2024-04-04

## 2024-04-18 ENCOUNTER — PRIOR AUTHORIZATION (OUTPATIENT)
Dept: FAMILY MEDICINE CLINIC | Facility: CLINIC | Age: 44
End: 2024-04-18
Payer: COMMERCIAL

## 2024-04-18 NOTE — TELEPHONE ENCOUNTER
Approved  Your PA request has been approved. Additional information will be provided in the approval communication. (Message 3121)  Authorization Expiration Date: 4/18/2025

## 2024-05-10 ENCOUNTER — OFFICE VISIT (OUTPATIENT)
Dept: FAMILY MEDICINE CLINIC | Facility: CLINIC | Age: 44
End: 2024-05-10
Payer: COMMERCIAL

## 2024-05-10 VITALS
RESPIRATION RATE: 16 BRPM | WEIGHT: 315 LBS | BODY MASS INDEX: 40.43 KG/M2 | OXYGEN SATURATION: 96 % | TEMPERATURE: 98.4 F | DIASTOLIC BLOOD PRESSURE: 70 MMHG | HEIGHT: 74 IN | SYSTOLIC BLOOD PRESSURE: 124 MMHG | HEART RATE: 76 BPM

## 2024-05-10 DIAGNOSIS — S53.432A SPRAIN OF LATERAL COLLATERAL LIGAMENT OF ELBOW, LEFT, INITIAL ENCOUNTER: Primary | ICD-10-CM

## 2024-05-10 DIAGNOSIS — J30.9 ALLERGIC RHINITIS, UNSPECIFIED SEASONALITY, UNSPECIFIED TRIGGER: ICD-10-CM

## 2024-05-10 RX ORDER — AZELASTINE 1 MG/ML
2 SPRAY, METERED NASAL 2 TIMES DAILY
Qty: 30 ML | Refills: 5 | Status: SHIPPED | OUTPATIENT
Start: 2024-05-10

## 2024-05-10 RX ORDER — PREDNISONE 20 MG/1
40 TABLET ORAL DAILY
Qty: 10 TABLET | Refills: 0 | Status: SHIPPED | OUTPATIENT
Start: 2024-05-10

## 2024-05-21 ENCOUNTER — TELEPHONE (OUTPATIENT)
Dept: FAMILY MEDICINE CLINIC | Facility: CLINIC | Age: 44
End: 2024-05-21
Payer: COMMERCIAL

## 2024-05-21 ENCOUNTER — PATIENT MESSAGE (OUTPATIENT)
Dept: FAMILY MEDICINE CLINIC | Facility: CLINIC | Age: 44
End: 2024-05-21
Payer: COMMERCIAL

## 2024-05-21 DIAGNOSIS — S83.422A SPRAIN OF LATERAL COLLATERAL LIGAMENT OF LEFT KNEE, INITIAL ENCOUNTER: Primary | ICD-10-CM

## 2024-05-21 NOTE — TELEPHONE ENCOUNTER
"\"So you told me to follow-up with you if my knee doesn't get any better after the round of steroids. It hasn't improved at all there is still a shrap stabbing pain if I try to get on my knee at all. Even resting it on like the car door while driving will cause pain. I know you said the next step would be to do some type of imaging for it. Just let me know who or where I will need to go to do that.     Thank you\"  "
Patient advised via patient initiated mychart message.   
Will check XR and refer to PT.  2 orders placed  
[facility]

## 2024-05-23 ENCOUNTER — TELEPHONE (OUTPATIENT)
Dept: FAMILY MEDICINE CLINIC | Facility: CLINIC | Age: 44
End: 2024-05-23
Payer: COMMERCIAL

## 2024-05-23 ENCOUNTER — HOSPITAL ENCOUNTER (OUTPATIENT)
Dept: GENERAL RADIOLOGY | Facility: HOSPITAL | Age: 44
Discharge: HOME | End: 2024-05-23
Admitting: FAMILY MEDICINE
Payer: COMMERCIAL

## 2024-05-23 DIAGNOSIS — S83.422A SPRAIN OF LATERAL COLLATERAL LIGAMENT OF LEFT KNEE, INITIAL ENCOUNTER: ICD-10-CM

## 2024-05-23 PROCEDURE — 73560 X-RAY EXAM OF KNEE 1 OR 2: CPT

## 2024-05-24 ENCOUNTER — TELEPHONE (OUTPATIENT)
Dept: FAMILY MEDICINE CLINIC | Facility: CLINIC | Age: 44
End: 2024-05-24
Payer: COMMERCIAL

## 2024-05-24 DIAGNOSIS — M23.92 INTERNAL DERANGEMENT OF LEFT KNEE: Primary | ICD-10-CM

## 2024-05-24 NOTE — TELEPHONE ENCOUNTER
"Message from yesterday: \"Pt stated that he done physical therapy before and it did not help with the pain. He would like to have a call back to discuss what other options are available. \"    Sarah message from today 5/24: \"  Ok so I spoke with someone yesterday and they said they would deliver a message to my Dr about trying to figure out what I wrong with my knee still and I wanted to follow up on that. I know the x-ray doesn't show all the muscles around the knee and in my opinion PT will do nothing because I can fully move the knee. I've done PT before when I destroyed my arm. What are my options? Is a MRI one of them? Because I know that will show more. Please let me know.  Thank you\"      "

## 2024-05-24 NOTE — TELEPHONE ENCOUNTER
I  will order MRI left knee.  However he made NO mention of doing PT for this acute injuryu    Often insurances will require documentation that he has failed PT to cover MRIs and we do not have this evidence.  I will order and see what insurance says but may need to get ortho involved if they deny the MRI.

## 2024-06-18 ENCOUNTER — TELEPHONE (OUTPATIENT)
Dept: FAMILY MEDICINE CLINIC | Facility: CLINIC | Age: 44
End: 2024-06-18
Payer: COMMERCIAL

## 2024-06-18 RX ORDER — LEVOTHYROXINE SODIUM 0.05 MG/1
50 TABLET ORAL DAILY
Qty: 30 TABLET | Refills: 0 | Status: SHIPPED | OUTPATIENT
Start: 2024-06-18 | End: 2024-06-21 | Stop reason: SDUPTHER

## 2024-06-18 NOTE — TELEPHONE ENCOUNTER
----- Message from [facility] sent at 6/17/2024  9:29 AM EDT -----  Regarding: Medication request   Contact: [phone]  I will be out of Levothyroxine in a couple days can I get this refilled before seeing you this week? And I'm not sure if I have any refills on my testosterone or not either. Thank you

## 2024-06-19 DIAGNOSIS — E29.1 HYPOGONADISM IN MALE: ICD-10-CM

## 2024-06-21 ENCOUNTER — TELEPHONE (OUTPATIENT)
Dept: FAMILY MEDICINE CLINIC | Facility: CLINIC | Age: 44
End: 2024-06-21

## 2024-06-21 ENCOUNTER — OFFICE VISIT (OUTPATIENT)
Dept: FAMILY MEDICINE CLINIC | Facility: CLINIC | Age: 44
End: 2024-06-21
Payer: COMMERCIAL

## 2024-06-21 ENCOUNTER — PATIENT MESSAGE (OUTPATIENT)
Dept: FAMILY MEDICINE CLINIC | Facility: CLINIC | Age: 44
End: 2024-06-21

## 2024-06-21 VITALS
TEMPERATURE: 97.8 F | BODY MASS INDEX: 40.43 KG/M2 | WEIGHT: 315 LBS | OXYGEN SATURATION: 96 % | HEIGHT: 74 IN | HEART RATE: 75 BPM | RESPIRATION RATE: 18 BRPM | SYSTOLIC BLOOD PRESSURE: 112 MMHG | DIASTOLIC BLOOD PRESSURE: 72 MMHG

## 2024-06-21 DIAGNOSIS — E78.5 DYSLIPIDEMIA: ICD-10-CM

## 2024-06-21 DIAGNOSIS — I10 ESSENTIAL HYPERTENSION: ICD-10-CM

## 2024-06-21 DIAGNOSIS — E29.1 HYPOGONADISM, MALE: ICD-10-CM

## 2024-06-21 DIAGNOSIS — E03.9 HYPOTHYROIDISM, UNSPECIFIED TYPE: ICD-10-CM

## 2024-06-21 DIAGNOSIS — E11.65 TYPE 2 DIABETES MELLITUS WITH HYPERGLYCEMIA, WITHOUT LONG-TERM CURRENT USE OF INSULIN: Primary | ICD-10-CM

## 2024-06-21 DIAGNOSIS — E29.1 HYPOGONADISM, MALE: Primary | ICD-10-CM

## 2024-06-21 PROCEDURE — 99214 OFFICE O/P EST MOD 30 MIN: CPT | Performed by: FAMILY MEDICINE

## 2024-06-21 RX ORDER — ROSUVASTATIN CALCIUM 5 MG/1
5 TABLET, COATED ORAL DAILY
Qty: 90 TABLET | Refills: 1 | Status: SHIPPED | OUTPATIENT
Start: 2024-06-21

## 2024-06-21 RX ORDER — LEVOTHYROXINE SODIUM 0.05 MG/1
50 TABLET ORAL DAILY
Qty: 90 TABLET | Refills: 1 | Status: SHIPPED | OUTPATIENT
Start: 2024-06-21 | End: 2024-06-21 | Stop reason: SDUPTHER

## 2024-06-21 RX ORDER — EMPAGLIFLOZIN 25 MG/1
25 TABLET, FILM COATED ORAL DAILY
Qty: 90 TABLET | Refills: 1 | Status: SHIPPED | OUTPATIENT
Start: 2024-06-21

## 2024-06-21 RX ORDER — LEVOTHYROXINE SODIUM 0.05 MG/1
50 TABLET ORAL DAILY
Qty: 90 TABLET | Refills: 1 | Status: SHIPPED | OUTPATIENT
Start: 2024-06-21

## 2024-06-21 RX ORDER — LISINOPRIL 5 MG/1
5 TABLET ORAL DAILY
Qty: 90 TABLET | Refills: 1 | Status: SHIPPED | OUTPATIENT
Start: 2024-06-21

## 2024-06-21 NOTE — PROGRESS NOTES
Subjective   Aguila Shen is a 44 y.o. male.     Diabetes     Diabetes Mellitus Type II, Follow-up:   Aguila Shen is a 44 y.o. male who is here for follow-up of Type 2 diabetes mellitus.  Current symptoms/problems include none and have been stable. Patient is adherent with medications.  Known diabetic complications: none  Cardiovascular risk factors: diabetes mellitus, dyslipidemia, hypertension, male gender, and obesity (BMI >= 30 kg/m2)  Current diabetic medications include  farxiga .   Not checking levels  He is on ACE inhibitor or angiotensin II receptor blocker. Patient is on a statin.       Aguila Shen  is here for follow-up of hypertension of several years duration. He is not exercising and is not adherent to a low-salt diet. Patient does not check his blood pressure.   . He is compliant with meds.        Aguila Shen returns today for follow up of Hyperlipidemia  Aguila indicates his exercise level as irregularly.  Diet: unchanged  Patient is compliant with medications   Any side effects to medications:   chest pain No myalgia No memory change No  Pt is due for labs      The following portions of the patient's history were reviewed and updated as appropriate: allergies, current medications, past family history, past medical history, past social history, past surgical history, and problem list.    Review of Systems   Constitutional: Negative.    Respiratory: Negative.     Cardiovascular: Negative.    Psychiatric/Behavioral: Negative.         Objective   Physical Exam  Vitals and nursing note reviewed.   Constitutional:       General: He is not in acute distress.     Appearance: Normal appearance. He is well-developed.   Cardiovascular:      Rate and Rhythm: Normal rate and regular rhythm.      Heart sounds: Normal heart sounds.   Pulmonary:      Effort: Pulmonary effort is normal.      Breath sounds: Normal breath sounds.   Neurological:      Mental Status: He is alert and oriented to  person, place, and time.   Psychiatric:         Mood and Affect: Mood normal.         Behavior: Behavior normal.         Thought Content: Thought content normal.         Judgment: Judgment normal.       Assessment & Plan   Diagnoses and all orders for this visit:    1. Type 2 diabetes mellitus with hyperglycemia, without long-term current use of insulin (Primary)  -     Jardiance 25 MG tablet tablet; Take 1 tablet by mouth Daily.  Dispense: 90 tablet; Refill: 1  -     Comprehensive Metabolic Panel  -     Hemoglobin A1c    2. Essential hypertension  -     levothyroxine (SYNTHROID, LEVOTHROID) 50 MCG tablet; Take 1 tablet by mouth Daily.  Dispense: 90 tablet; Refill: 1  -     lisinopril (PRINIVIL,ZESTRIL) 5 MG tablet; Take 1 tablet by mouth Daily.  Dispense: 90 tablet; Refill: 1  -     CBC & Differential  -     Comprehensive Metabolic Panel    3. Hypothyroidism, unspecified type  -     TSH+Free T4    4. Hypogonadism, male  -     Testosterone    5. Dyslipidemia  -     rosuvastatin (CRESTOR) 5 MG tablet; Take 1 tablet by mouth Daily.  Dispense: 90 tablet; Refill: 1  -     Comprehensive Metabolic Panel  -     Lipid Panel    He has been doing well on jardiance, we discussed weekly injection but he declines at this time.  Continue synthr 50 as he has been on this for a while.  Only on 50  No change in lisinopril  Continue crestor and check lipids    He has been on T injections, I discussed this with Dr. Gomez who was not aware he was self injecting at home.  I informed pt I was not allowed to do this per [facility] primary care policies.  I offered xyosted or urology appointment.  Will see if he is interested in either option

## 2024-06-21 NOTE — TELEPHONE ENCOUNTER
I did talk with Dr. Gomez, she was not aware Lc was doing injecitons at home.  We are simply not allowed to write for at home injection use form this office, per [facility] policy, which is what I told pt.    We can try alternative xyosted and see if this is covered, we can refer to urology to discuss home injections, or we could try androgel instead.  Let me know what pt prefers.

## 2024-06-21 NOTE — TELEPHONE ENCOUNTER
----- [facility] sent at 6/21/2024 10:41 AM EDT -----  Regarding: Testosterone   Contact: [phone]  I'm sorry I lost track during our conversation will you let me know if Dr Alcantar is going to continue writing my testosterone script? I was due for a shot yesterday is why I'm asking.  Thank you

## 2024-06-22 LAB
ALBUMIN SERPL-MCNC: 4.3 G/DL (ref 3.5–5.2)
ALBUMIN/GLOB SERPL: 1.6 G/DL
ALP SERPL-CCNC: 63 U/L (ref 39–117)
ALT SERPL-CCNC: 29 U/L (ref 1–41)
AST SERPL-CCNC: 21 U/L (ref 1–40)
BASOPHILS # BLD AUTO: 0.01 10*3/MM3 (ref 0–0.2)
BASOPHILS NFR BLD AUTO: 0.2 % (ref 0–1.5)
BILIRUB SERPL-MCNC: 0.5 MG/DL (ref 0–1.2)
BUN SERPL-MCNC: 13 MG/DL (ref 6–20)
BUN/CREAT SERPL: 14.8 (ref 7–25)
CALCIUM SERPL-MCNC: 9.3 MG/DL (ref 8.6–10.5)
CHLORIDE SERPL-SCNC: 104 MMOL/L (ref 98–107)
CHOLEST SERPL-MCNC: 121 MG/DL (ref 0–200)
CO2 SERPL-SCNC: 24.5 MMOL/L (ref 22–29)
CREAT SERPL-MCNC: 0.88 MG/DL (ref 0.76–1.27)
EGFRCR SERPLBLD CKD-EPI 2021: 107.4 ML/MIN/1.73
EOSINOPHIL # BLD AUTO: 0.07 10*3/MM3 (ref 0–0.4)
EOSINOPHIL NFR BLD AUTO: 1.1 % (ref 0.3–6.2)
ERYTHROCYTE [DISTWIDTH] IN BLOOD BY AUTOMATED COUNT: 12.8 % (ref 12.3–15.4)
GLOBULIN SER CALC-MCNC: 2.7 GM/DL
GLUCOSE SERPL-MCNC: 120 MG/DL (ref 65–99)
HBA1C MFR BLD: 6.9 % (ref 4.8–5.6)
HCT VFR BLD AUTO: 50.5 % (ref 37.5–51)
HDLC SERPL-MCNC: 34 MG/DL (ref 40–60)
HGB BLD-MCNC: 16.9 G/DL (ref 13–17.7)
IMM GRANULOCYTES # BLD AUTO: 0.01 10*3/MM3 (ref 0–0.05)
IMM GRANULOCYTES NFR BLD AUTO: 0.2 % (ref 0–0.5)
LDLC SERPL CALC-MCNC: 63 MG/DL (ref 0–100)
LYMPHOCYTES # BLD AUTO: 2.07 10*3/MM3 (ref 0.7–3.1)
LYMPHOCYTES NFR BLD AUTO: 32.3 % (ref 19.6–45.3)
MCH RBC QN AUTO: 31.6 PG (ref 26.6–33)
MCHC RBC AUTO-ENTMCNC: 33.5 G/DL (ref 31.5–35.7)
MCV RBC AUTO: 94.4 FL (ref 79–97)
MONOCYTES # BLD AUTO: 0.5 10*3/MM3 (ref 0.1–0.9)
MONOCYTES NFR BLD AUTO: 7.8 % (ref 5–12)
NEUTROPHILS # BLD AUTO: 3.75 10*3/MM3 (ref 1.7–7)
NEUTROPHILS NFR BLD AUTO: 58.4 % (ref 42.7–76)
NRBC BLD AUTO-RTO: 0 /100 WBC (ref 0–0.2)
PLATELET # BLD AUTO: 278 10*3/MM3 (ref 140–450)
POTASSIUM SERPL-SCNC: 4.4 MMOL/L (ref 3.5–5.2)
PROT SERPL-MCNC: 7 G/DL (ref 6–8.5)
RBC # BLD AUTO: 5.35 10*6/MM3 (ref 4.14–5.8)
SODIUM SERPL-SCNC: 140 MMOL/L (ref 136–145)
T4 FREE SERPL-MCNC: 1.14 NG/DL (ref 0.92–1.68)
TESTOST SERPL-MCNC: 562 NG/DL (ref 264–916)
TRIGL SERPL-MCNC: 137 MG/DL (ref 0–150)
TSH SERPL DL<=0.005 MIU/L-ACNC: 2.09 UIU/ML (ref 0.27–4.2)
VLDLC SERPL CALC-MCNC: 24 MG/DL (ref 5–40)
WBC # BLD AUTO: 6.41 10*3/MM3 (ref 3.4–10.8)

## 2024-06-23 ENCOUNTER — HOSPITAL ENCOUNTER (OUTPATIENT)
Dept: MRI IMAGING | Facility: HOSPITAL | Age: 44
Discharge: HOME | End: 2024-06-23
Admitting: FAMILY MEDICINE
Payer: COMMERCIAL

## 2024-06-23 DIAGNOSIS — M23.92 INTERNAL DERANGEMENT OF LEFT KNEE: ICD-10-CM

## 2024-06-23 PROCEDURE — 73721 MRI JNT OF LWR EXTRE W/O DYE: CPT

## 2024-06-24 RX ORDER — TESTOSTERONE CYPIONATE 200 MG/ML
100 INJECTION, SOLUTION INTRAMUSCULAR
Qty: 4 ML | Refills: 2 | OUTPATIENT
Start: 2024-06-24

## 2024-06-24 NOTE — TELEPHONE ENCOUNTER
"Reply from pt:  \"Well I can't do a gel because I have little kids and it doesn't matter where I'd place it I won't take the risk of them being exposed. Let me call the pharmacy and see how expensive that our would be.  So i guess you can go ahead and send me a referral because this has been done since I've been a patient with [facility] and now I'm sure it'll be weeks before I can get back on schedule. \"  Part 2:  \"So after just a simple search on the internet I will absolutely not do the cost of that new medicine at all. I find this to be the craziest thing I've heard of. I've been a patient with [facility] for close to a year and a half and been on a injectable medicine the whole time and now it's not allowed. What did the Dr's think the script was being used for? I'm sure this is wholly financial decision that makes patients have to see multiple doctors so they will be able to bill more to insurance companies. \"    "

## 2024-06-25 DIAGNOSIS — M23.007 PERIMENISCAL CYST OF LEFT KNEE: ICD-10-CM

## 2024-06-25 DIAGNOSIS — S83.282A ACUTE LATERAL MENISCUS TEAR OF LEFT KNEE, INITIAL ENCOUNTER: Primary | ICD-10-CM

## 2024-06-26 ENCOUNTER — OFFICE VISIT (OUTPATIENT)
Age: 44
End: 2024-06-26
Payer: COMMERCIAL

## 2024-06-26 VITALS
WEIGHT: 315 LBS | DIASTOLIC BLOOD PRESSURE: 82 MMHG | BODY MASS INDEX: 42.66 KG/M2 | HEIGHT: 72 IN | SYSTOLIC BLOOD PRESSURE: 130 MMHG

## 2024-06-26 DIAGNOSIS — M23.007: ICD-10-CM

## 2024-06-26 DIAGNOSIS — S83.272A COMPLEX TEAR OF LATERAL MENISCUS OF LEFT KNEE AS CURRENT INJURY, INITIAL ENCOUNTER: Primary | ICD-10-CM

## 2024-06-26 PROCEDURE — 99204 OFFICE O/P NEW MOD 45 MIN: CPT | Performed by: ORTHOPAEDIC SURGERY

## 2024-06-26 NOTE — PROGRESS NOTES
[facility] Note        Subjective     Pain of the Left Knee      HPI    Aguila Shen is a 44 y.o. male.  He injured his left knee mowing his yard on April 27.  He was down on the ground and then got up and felt a pop.  Since then he has had lateral knee pain.  He has mechanical symptoms.  He works as a .  He had x-rays May 23 and MRI June 23.  His main issue is mechanical symptoms in the left knee.  No prior left knee surgery.  Pain is not currently manageable with oral NSAIDs.    Past Medical History:   Diagnosis Date    Allergic     Diabetes mellitus     HL (hearing loss)     Hypothyroidism     Obesity       Past Surgical History:   Procedure Laterality Date    TONSILLECTOMY      VASECTOMY        Family History   Problem Relation Age of Onset    Hypertension Mother     Arthritis Mother     Hyperlipidemia Mother     Hypertension Father     Diabetes Father     Cancer Brother     Hypertension Maternal Grandmother     Diabetes Maternal Grandmother     Hypertension Maternal Grandfather     Diabetes Maternal Grandfather     Hypertension Paternal Grandmother     Diabetes Paternal Grandmother     Hypertension Paternal Grandfather     Diabetes Paternal Grandfather      Social History     Socioeconomic History    Marital status:    Tobacco Use    Smoking status: Never    Smokeless tobacco: Current     Types: Chew    Tobacco comments:     Dip started 2003 quit 2-3 times   Vaping Use    Vaping status: Never Used   Substance and Sexual Activity    Alcohol use: Yes     Alcohol/week: 1.0 standard drink of alcohol     Types: 1 Cans of beer per week     Comment: socially    Drug use: Never    Sexual activity: Yes     Partners: Female      Current Outpatient Medications on File Prior to Visit   Medication Sig Dispense Refill    azelastine (ASTELIN) 0.1 % nasal spray 2 sprays into the nostril(s) as directed by provider 2 (Two) Times a Day. Use in each nostril as directed 30 mL  "5    BD Eclipse Syringe 25G X 1\" 3 ML misc USE TO INJECT 0.5 ML OF TESTOSTERONE ONCE EVERY 7 DAYS      cyclobenzaprine (FLEXERIL) 10 MG tablet Take 1 tablet by mouth 3 (Three) Times a Day As Needed for Muscle Spasms. 90 tablet 0    Diclofenac Sodium (VOLTAREN) 1 % gel gel Apply 4 g topically to the appropriate area as directed 3 (Three) Times a Day. 50 g 1    Jardiance 25 MG tablet tablet Take 1 tablet by mouth Daily. 90 tablet 1    levothyroxine (SYNTHROID, LEVOTHROID) 50 MCG tablet Take 1 tablet by mouth Daily. 90 tablet 1    lisinopril (PRINIVIL,ZESTRIL) 5 MG tablet Take 1 tablet by mouth Daily. 90 tablet 1    meloxicam (MOBIC) 15 MG tablet TAKE 1 TABLET BY MOUTH EVERY DAY 90 tablet 1    multivitamin with minerals tablet tablet Take 1 tablet by mouth Daily.      Omega-3 Fatty Acids (fish oil) 1000 MG capsule capsule Take 1 capsule by mouth Daily With Breakfast.      pseudoephedrine (SUDAFED) 30 MG tablet Take 1 tablet by mouth Every 4 (Four) Hours As Needed for Congestion.      rosuvastatin (CRESTOR) 5 MG tablet Take 1 tablet by mouth Daily. 90 tablet 1    Syringe/Needle, Disp, 25G X 1\" 1 ML misc Use to inject 0.5 ml of testosterone once every 7 days 25 each 1    Testosterone Cypionate (DEPOTESTOTERONE CYPIONATE) 200 MG/ML injection Inject 0.5 mL into the appropriate muscle as directed by prescriber Every 7 (Seven) Days. (Patient not taking: Reported on 6/26/2024) 4 mL 2     No current facility-administered medications on file prior to visit.      Allergies   Allergen Reactions    Metformin Diarrhea          Review of Systems   Constitutional: Negative.    HENT: Negative.     Eyes: Negative.    Respiratory: Negative.     Cardiovascular: Negative.    Gastrointestinal: Negative.    Endocrine: Negative.    Genitourinary: Negative.    Musculoskeletal:  Positive for arthralgias.   Skin: Negative.    Allergic/Immunologic: Negative.    Neurological: Negative.    Hematological: Negative.    Psychiatric/Behavioral: " "Negative.          I reviewed the patient's chief complaint, history of present illness, review of systems, past medical history, surgical history, family history, social history, medications and allergy list.        Objective      Physical Exam  /82   Ht 182.9 cm (72\")   Wt (!) 160 kg (351 lb 12.8 oz)   BMI 47.71 kg/m²     Body mass index is 47.71 kg/m².    General  Mental Status - alert  General Appearance - cooperative, well groomed, not in acute distress  Orientation - Oriented X3  Build & Nutrition - well developed and well nourished  Posture - normal posture  Gait - normal gait       Ortho Exam  Left knee with tender lateral joint line.  Palpable    Imaging/Studies Reviewed and Interpreted:  Imaging Results (Last 24 Hours)       ** No results found for the last 24 hours. **        I personally viewed his x-rays from May 23 left knee which are negative  I personally viewed and interpreted the MRI from June 23 as a lateral meniscus tear with parameniscal cyst    Assessment    Assessment:  1. Complex tear of lateral meniscus of left knee as current injury, initial encounter    2. Perimeniscal cyst, left        Plan:  The plan is for left knee arthroscopy with partial lateral meniscectomy.Treatment options and alternatives were discussed with patient.  Surgical risks include but are not limited to pain, bleeding, infection, failure to relieve symptoms, need for further procedures, recurrence of symptoms, damage to healthy adjacent structures, hardware loosening/failure, stiffness, weakness, scar, blood clots/DVT/PE, loss of limb or life. We also discussed the postoperative protocol and expected outcome although no guarantees are possible with surgery. All questions were answered; the patient would like to proceed with surgical intervention.  He works as a .  He may return to work seated job only within a week and I anticipate no restrictions 1 to 4 weeks postsurgery        Jad DOLAN" MD Dez  06/26/24  10:42 EDT      Dictated Utilizing Dragon Dictation.

## 2024-06-27 ENCOUNTER — TELEPHONE (OUTPATIENT)
Dept: ORTHOPEDIC SURGERY | Facility: CLINIC | Age: 44
End: 2024-06-27
Payer: COMMERCIAL

## 2024-06-27 NOTE — TELEPHONE ENCOUNTER
THIS PATIENT WORKS AS A , LIGHT DUTY OR SEATED DUTY MAY BE AVAILABLE TO HIM. HOW LONG WOULD HE NEED TO BE OFF WORK/ ON LIGHT DUTY AFTER HIS KNEE SCOPE?    RT KNEE SCOPE PARTIAL LATERAL MENISCECTOMY SCHEDULED FOR 7/11/24

## 2024-07-01 NOTE — TELEPHONE ENCOUNTER
LET PATIENT KNOW DR. HUYNH'S RESPONSE, HE VERBALIZED UNDERSTANDING AND WILL BE COMING TO [facility] OFF [facility] PAPERWORK TOMORROW

## 2024-07-02 ENCOUNTER — OFFICE VISIT (OUTPATIENT)
Age: 44
End: 2024-07-02
Payer: COMMERCIAL

## 2024-07-02 VITALS
HEART RATE: 65 BPM | OXYGEN SATURATION: 97 % | BODY MASS INDEX: 42.66 KG/M2 | DIASTOLIC BLOOD PRESSURE: 85 MMHG | SYSTOLIC BLOOD PRESSURE: 165 MMHG | HEIGHT: 72 IN | WEIGHT: 315 LBS

## 2024-07-02 DIAGNOSIS — E29.1 HYPOGONADISM IN MALE: ICD-10-CM

## 2024-07-02 DIAGNOSIS — E29.1 HYPOGONADISM, MALE: Primary | ICD-10-CM

## 2024-07-02 RX ORDER — BLOOD PRESSURE TEST KIT
KIT MISCELLANEOUS
Qty: 100 EACH | Refills: 11 | Status: SHIPPED | OUTPATIENT
Start: 2024-07-02

## 2024-07-02 RX ORDER — TESTOSTERONE CYPIONATE 200 MG/ML
200 INJECTION, SOLUTION INTRAMUSCULAR
Qty: 4 ML | Refills: 0 | Status: SHIPPED | OUTPATIENT
Start: 2024-07-02

## 2024-07-02 NOTE — PROGRESS NOTES
"Chief Complaint  Low testosterone    Referring Provider  Khurram Evans MD    HPI  Mr. Shen is a 44 y.o. male with history of hypogonadism on HRT who presents to establish care.    He is on TC 100mg weekly and happy with this format. He has no concerns, but needed to get established with Urology for ongoing HRT. He denies adverse effects, highs and lows between injections.     He has 2 daughters at home and has had a vasectomy.    Past Medical History  Past Medical History:   Diagnosis Date    Allergic     Diabetes mellitus     HL (hearing loss)     Hypothyroidism     Obesity        Past Surgical History  Past Surgical History:   Procedure Laterality Date    TONSILLECTOMY      VASECTOMY         Medications    Current Outpatient Medications:     azelastine (ASTELIN) 0.1 % nasal spray, 2 sprays into the nostril(s) as directed by provider 2 (Two) Times a Day. Use in each nostril as directed, Disp: 30 mL, Rfl: 5    BD Eclipse Syringe 25G X 1\" 3 ML misc, USE TO INJECT 0.5 ML OF TESTOSTERONE ONCE EVERY 7 DAYS, Disp: , Rfl:     cyclobenzaprine (FLEXERIL) 10 MG tablet, Take 1 tablet by mouth 3 (Three) Times a Day As Needed for Muscle Spasms., Disp: 90 tablet, Rfl: 0    Diclofenac Sodium (VOLTAREN) 1 % gel gel, Apply 4 g topically to the appropriate area as directed 3 (Three) Times a Day., Disp: 50 g, Rfl: 1    Jardiance 25 MG tablet tablet, Take 1 tablet by mouth Daily., Disp: 90 tablet, Rfl: 1    levothyroxine (SYNTHROID, LEVOTHROID) 50 MCG tablet, Take 1 tablet by mouth Daily., Disp: 90 tablet, Rfl: 1    lisinopril (PRINIVIL,ZESTRIL) 5 MG tablet, Take 1 tablet by mouth Daily., Disp: 90 tablet, Rfl: 1    meloxicam (MOBIC) 15 MG tablet, TAKE 1 TABLET BY MOUTH EVERY DAY, Disp: 90 tablet, Rfl: 1    multivitamin with minerals tablet tablet, Take 1 tablet by mouth Daily., Disp: , Rfl:     Omega-3 Fatty Acids (fish oil) 1000 MG capsule capsule, Take 1 capsule by mouth Daily With Breakfast., Disp: , Rfl:     pseudoephedrine " "(SUDAFED) 30 MG tablet, Take 1 tablet by mouth Every 4 (Four) Hours As Needed for Congestion., Disp: , Rfl:     rosuvastatin (CRESTOR) 5 MG tablet, Take 1 tablet by mouth Daily., Disp: 90 tablet, Rfl: 1    Syringe/Needle, Disp, 25G X 1\" 1 ML misc, Use to inject 0.5 ml of testosterone once every 7 days, Disp: 25 each, Rfl: 1    Testosterone Cypionate (DEPOTESTOTERONE CYPIONATE) 200 MG/ML injection, Inject 1 mL into the appropriate muscle as directed by prescriber Every 7 (Seven) Days., Disp: 4 mL, Rfl: 0    Alcohol Swabs pads, Clean area prior to injection, Disp: 100 each, Rfl: 11    Needle, Disp, (Hypodermic Needle) 23G X 1\" misc, Use 1 Device As Needed (Injection of Testosterone)., Disp: 50 each, Rfl: 11    Needle, Disp, 18G X 1-1/2\" misc, Use for drawing up the medication, Disp: 50 each, Rfl: 3    Syringe, Disposable, 3 ML misc, Use 1 syringe 1 (One) Time Per Week., Disp: 100 each, Rfl: 11    Allergies  Allergies   Allergen Reactions    Metformin Diarrhea       Social History  Social History     Socioeconomic History    Marital status:    Tobacco Use    Smoking status: Never    Smokeless tobacco: Current     Types: Chew    Tobacco comments:     Dip started 2003 quit 2-3 times   Vaping Use    Vaping status: Never Used   Substance and Sexual Activity    Alcohol use: Yes     Alcohol/week: 1.0 standard drink of alcohol     Types: 1 Cans of beer per week     Comment: socially    Drug use: Never    Sexual activity: Yes     Partners: Female       Family History  He has no family history of prostate cancer      Physical Exam  Visit Vitals  /85   Pulse 65   Ht 182.9 cm (72\")   Wt (!) 159 kg (351 lb)   SpO2 97%   BMI 47.60 kg/m²         Labs  Lab Results   Component Value Date    TESTOSTERONE 620.00 12/01/2022         Lab Results   Component Value Date    PSA 0.512 12/01/2022       Lab Results   Component Value Date    WBC 6.41 06/21/2024    HGB 16.9 06/21/2024    HCT 50.5 06/21/2024    MCV 94.4 06/21/2024    " " 06/21/2024         Assessment/Plan  Mr. Shen is a 44 y.o. male with low testosterone.    As above, patient has been on HRT for several years.  He is not concerned with fertility, has 2 children, has had a vasectomy.  He has no concerns with this format and is eager to continue.  His previous dosing is 100 mg of testosterone cypionate weekly.  He has been therapeutic for quite some time based on his lab work.  No history of polycythemia, though his most recent hematocrit was borderline at 50.    We will continue testosterone cypionate weekly.  He is aware of various other formats but not interested in transitioning at this time.    Diagnoses and all orders for this visit:    1. Hypogonadism, male (Primary)  -     Hemoglobin & Hematocrit, Blood; Future  -     Testosterone; Future  -     Needle, Disp, 18G X 1-1/2\" misc; Use for drawing up the medication  Dispense: 50 each; Refill: 3  -     Needle, Disp, (Hypodermic Needle) 23G X 1\" misc; Use 1 Device As Needed (Injection of Testosterone).  Dispense: 50 each; Refill: 11  -     Alcohol Swabs pads; Clean area prior to injection  Dispense: 100 each; Refill: 11  -     Syringe, Disposable, 3 ML misc; Use 1 syringe 1 (One) Time Per Week.  Dispense: 100 each; Refill: 11    2. Hypogonadism in male  -     Testosterone Cypionate (DEPOTESTOTERONE CYPIONATE) 200 MG/ML injection; Inject 1 mL into the appropriate muscle as directed by prescriber Every 7 (Seven) Days.  Dispense: 4 mL; Refill: 0        Follow Up  Return in about 6 months (around 1/2/2025) for Testo, Labs Prior.    Eliza Garces PA-C  [facility] [place]     "

## 2024-07-24 ENCOUNTER — OFFICE VISIT (OUTPATIENT)
Age: 44
End: 2024-07-24
Payer: COMMERCIAL

## 2024-07-24 VITALS — TEMPERATURE: 98.6 F

## 2024-07-24 DIAGNOSIS — Z98.890 S/P LEFT KNEE ARTHROSCOPY: Primary | ICD-10-CM

## 2024-07-24 PROCEDURE — 99024 POSTOP FOLLOW-UP VISIT: CPT | Performed by: ORTHOPAEDIC SURGERY

## 2024-07-24 NOTE — PROGRESS NOTES
Chief Complaint   Patient presents with    Post-op     2 weeks status post Left knee arthroscopy with partial lateral meniscectomy - 7/11/24           HPI    He is doing well.  He is about ready to return to work.  He feels he can do light duty.  He works as a     Vitals:    07/24/24 1101   Temp: 98.6 °F (37 °C)         Physical Exam:    Left knee looks good.  Negative Homans' sign.  Normal gait full motion full-strength    X-RAY REPORT:  Imaging Results (Last 7 Days)       ** No results found for the last 168 hours. **                  ICD-10-CM ICD-9-CM   1. S/P left knee arthroscopy  Z98.890 V45.89     I have ordered physical therapy.  He will continue on restrictions seated job only until follow-up in 2 to 3 weeks.      Jad Garces M.D., FAAOS  Orthopedic Surgeon  Fellowship Trained Sports Medicine  [facility]  Orthopedics and Sports Medicine  [address] [place] [address], [address]  [place] [address]      EMR Dragon/Transcription disclaimer:  Please note that portions of this document were completed with a voice recognition program.      At [facility], we believe that sharing information builds trust and better relationships. You are receiving this note because you are receiving care at [facility] or have recently visited. It is possible you will see health information before a provider has talked with you about it. This kind of information can be easy to misunderstand as it is a medical document. It is intended as ngzf-ka-ihyq communication. It is written in medical language and may contain abbreviations or verbiage that are unfamiliar. It may appear blunt or direct. Medical documents are intended to carry relevant information, facts as evident, and the clinical opinion of the practitioner.  To help you fully understand what it means for your health, we urge you to discuss this note with your provider.

## 2024-07-26 ENCOUNTER — TREATMENT (OUTPATIENT)
Dept: PHYSICAL THERAPY | Facility: CLINIC | Age: 44
End: 2024-07-26
Payer: COMMERCIAL

## 2024-07-26 DIAGNOSIS — M25.562 ACUTE PAIN OF LEFT KNEE: Primary | ICD-10-CM

## 2024-07-26 DIAGNOSIS — Z98.890 HISTORY OF ORTHOPEDIC SURGERY: ICD-10-CM

## 2024-07-26 NOTE — PROGRESS NOTES
Physical Therapy Initial Evaluation and Plan of Care    [facility]  [address]  [place] [address]    Patient: Aguila Shen   : 1980  Diagnosis/ICD-10 Code:  Acute pain of left knee [M25.562]  Referring practitioner: Jad Garces MD  Date of Initial Visit: 2024  Today's Date: 2024  Patient seen for 1 sessions           Subjective Questionnaire: LEFS: 64/80      Subjective Evaluation    History of Present Illness  Mechanism of injury: S/p L lateral meniscectomy on . He twisted his L knee when he was walking downhill, his R foot slipped and his L foot was caught under him. He felt a pop and MRI found L lateral meniscal tear. He feels like he can do anything he can, but pain continues to linger along the front of his L knee with stairs and prolonged walking.      Patient Occupation:    Precautions and Work Restrictions: sitting duty for 2 weeks starting yesterdayPain  Current pain ratin  At best pain ratin  At worst pain ratin  Exacerbated by: stairs.    Patient Goals  Patient goals for therapy: decreased pain and increased strength             Objective          Observations     Additional Knee Observation Details  Severe L>R pes planus    Moderate swelling directly deep to incision at lateral swelling    Tenderness   Left Knee   Tenderness in the lateral joint line.     Active Range of Motion   Left Knee   Flexion: WFL and with pain  Extension: WFL    Right Knee   Flexion: WFL  Extension: WFL    Passive Range of Motion   Left Knee   Flexion: WFL and with pain  Extension: WFL    Right Knee   Flexion: WFL  Extension: WFL    Patellar Mobility     Additional Patellar Mobility Details  Guarded, mild L pain with medial glide    Joint Play   Left Ankle/Foot  Joints within functional limits are the forefoot. Hypomobile in the talocrural joint, subtalar joint and midfoot.     Right Ankle/Foot  Joints within functional limits  are the talocrural joint and forefoot. Hypomobile in the subtalar joint and midfoot.     Strength/Myotome Testing     Left Hip   Planes of Motion   Flexion: 4+  Extension: 4+  Abduction: 5  External rotation: 4+    Right Hip   Planes of Motion   Flexion: 5  Extension: 5  Abduction: 4+  External rotation: 4+    Left Knee   Flexion: 4+ (discomfort)  Extension: 5  Quadriceps contraction: fair    Right Knee   Flexion: 5  Extension: 5  Quadriceps contraction: good    Left Ankle/Foot   Left ankle plantar flexion strength: 1 partial SL heel raise.    Right Ankle/Foot   Right ankle plantar flexion strength: 1 partial SL heel raise.    Tests     Additional Tests Details  Mild increase in L valgus laxity    Ambulation     Comments   B knee valgus and severe ankle over pronation          Assessment & Plan       Assessment  Impairments: abnormal gait, abnormal muscle firing, abnormal or restricted ROM, activity intolerance, impaired physical strength, lacks appropriate home exercise program and pain with function   Functional limitations: walking, uncomfortable because of pain, standing and stooping   Assessment details: Patient is a 44-year-old male who presents status post left lateral meniscectomy on 7/11 after experiencing hyperflexion injury a few months ago.  Patient reports his left knee feels better than prior to surgery, but he experiences intermittent pain with stairs and prolonged walking.  Left knee ROM is normal, but painful into flexion.  Moderate left lateral joint line swelling remains present.  Some of his residual knee pain seems patellofemoral in nature due to motor control deficits and altered mechanics secondary to structural changes at his ankles and knees.  Overall, patient doing well and should not require much PT to reach PLOF.  Barriers to therapy: Comorbidities  Prognosis: good    Goals  Plan Goals: Short term goals (4 weeks)  1. Patient to be compliant with initial HEP.  2. Patient to demonstrate  pain-free left knee PROM flexion.  3. Patient to improve LEFS to greater than or equal to 70/80.    Long Term goals (8 weeks)  1. Patient to demonstrate pain-free left knee AROM flexion.  2. Patient to demonstrate pain free and normal strength with MMTs.  3. Patient to ascend and descend eight 8 inch steps reciprocally with one handrail with minimal to no antalgia or difficulty.  4. Patient to demonstrate independence with home exercise program.  5. Patient to improve LEFS to greater than or equal to 74/80.  6.  Patient to return to full work duty without any limitation due to left knee.       Plan  Therapy options: will be seen for skilled therapy services  Planned modality interventions: thermotherapy (hydrocollator packs), TENS, cryotherapy, dry needling and electrical stimulation/Russian stimulation  Planned therapy interventions: manual therapy, neuromuscular re-education, soft tissue mobilization, strengthening, stretching, therapeutic activities, joint mobilization, home exercise program, functional ROM exercises, balance/weight-bearing training, abdominal trunk stabilization, postural training, motor coordination training, spinal/joint mobilization, ADL retraining, transfer training, body mechanics training and gait training  Frequency: 1x week  Duration in weeks: 8  Treatment plan discussed with: patient        Timed:  Manual Therapy:    0     mins  80735;  Therapeutic Exercise:    8     mins  39385;     Neuromuscular Adalberto:    0    mins  00963;    Therapeutic Activity:     15     mins  72054;     Gait Trainin     mins  01541;     Ultrasound:     0     mins  44617;    Electrical Stimulation:    0     mins  32402 ( );    Untimed:  Electrical Stimulation:    0     mins  73382 ( );  Mechanical Traction:    0     mins  75143;   Dry Needlin     mins      Timed Treatment:   23   mins   Total Treatment:     45   mins    PT SIGNATURE: Dex Marinelli PT   License Number: [id number]  DATE  TREATMENT INITIATED: 7/26/2024    Initial Certification  Certification Period: 10/24/2024  I certify that the therapy services are furnished while this patient is under my care.  The services outlined above are required by this patient, and will be reviewed every 90 days.     PHYSICIAN: Jad Garces MD      DATE:     Please sign and return via fax to [phone].. Thank you, [facility].

## 2024-07-30 DIAGNOSIS — E29.1 HYPOGONADISM IN MALE: ICD-10-CM

## 2024-07-30 RX ORDER — TESTOSTERONE CYPIONATE 200 MG/ML
200 INJECTION, SOLUTION INTRAMUSCULAR
Qty: 4 ML | Refills: 0 | Status: SHIPPED | OUTPATIENT
Start: 2024-07-30

## 2024-07-30 NOTE — TELEPHONE ENCOUNTER
Rx Refill Note  Requested Prescriptions     Pending Prescriptions Disp Refills    Testosterone Cypionate (DEPOTESTOTERONE CYPIONATE) 200 MG/ML injection 4 mL 0     Sig: Inject 1 mL into the appropriate muscle as directed by prescriber Every 7 (Seven) Days.      Last office visit with prescribing clinician: 7/2/2024   Next office visit with prescribing clinician: 1/2/2025       Fartun Vázquez MA  07/30/24, 09:57 EDT

## 2024-08-07 ENCOUNTER — OFFICE VISIT (OUTPATIENT)
Age: 44
End: 2024-08-07
Payer: COMMERCIAL

## 2024-08-07 DIAGNOSIS — Z98.890 S/P LEFT KNEE ARTHROSCOPY: Primary | ICD-10-CM

## 2024-08-07 PROCEDURE — 99024 POSTOP FOLLOW-UP VISIT: CPT | Performed by: ORTHOPAEDIC SURGERY

## 2024-08-07 NOTE — PROGRESS NOTES
Chief Complaint   Patient presents with    Post-op     2 week follow up; 4 weeks status post Left knee arthroscopy with partial lateral meniscectomy - 7/11/24           HPI  He is doing well without complaints.  He feels ready to return to work tomorrow full duty.      There were no vitals filed for this visit.      Physical Exam:  Left knee has full motion full-strength.  No swelling or tenderness.  Negative Homans' sign      ICD-10-CM ICD-9-CM   1. S/P left knee arthroscopy  Z98.890 V45.89     He is doing very well.  He is advance activity as tolerated.  Return to work tomorrow full duty without restriction.  He will follow-up as needed        Jad Garces M.D., FAAOS  Orthopedic Surgeon  Fellowship Trained Sports Medicine  [facility]  Orthopedics and Sports Medicine  [address] [place] [address], [address]  [place] [address]      EMR Dragon/Transcription disclaimer:  Please note that portions of this document were completed with a voice recognition program.      At [facility], we believe that sharing information builds trust and better relationships. You are receiving this note because you are receiving care at [facility] or have recently visited. It is possible you will see health information before a provider has talked with you about it. This kind of information can be easy to misunderstand as it is a medical document. It is intended as addf-ib-hntk communication. It is written in medical language and may contain abbreviations or verbiage that are unfamiliar. It may appear blunt or direct. Medical documents are intended to carry relevant information, facts as evident, and the clinical opinion of the practitioner.  To help you fully understand what it means for your health, we urge you to discuss this note with your provider.

## 2024-08-16 ENCOUNTER — TREATMENT (OUTPATIENT)
Dept: PHYSICAL THERAPY | Facility: CLINIC | Age: 44
End: 2024-08-16
Payer: COMMERCIAL

## 2024-08-16 DIAGNOSIS — M25.562 ACUTE PAIN OF LEFT KNEE: Primary | ICD-10-CM

## 2024-08-16 DIAGNOSIS — Z98.890 HISTORY OF ORTHOPEDIC SURGERY: ICD-10-CM

## 2024-08-16 NOTE — PROGRESS NOTES
Physical Therapy Daily Progress Note    [facility]  [address]  [place] [address]      Patient: Aguila Shen   : 1980  Diagnosis/ICD-10 Code:  Acute pain of left knee [M25.562]  Referring practitioner: Jad Garces MD  Date of Initial Visit: Type: THERAPY  Noted: 2024  Today's Date: 2024  Patient seen for 2 sessions           Subjective   Patient reports mild L knee pain since last visit. He was released to full work duty last week without issue. He has not completed HEP or used the stationary bike at work.    Objective          Observations     Additional Knee Observation Details  Trace L lateral compartment effusion under scar    Tenderness   Left Knee   No tenderness in the lateral joint line, medial joint line and patellar tendon.     Active Range of Motion   Left Knee   Normal active range of motion  Flexion: Left knee active flexion: feels tight at end range.     Passive Range of Motion   Left Knee   Normal passive range of motion  Flexion: Left knee passive flexion: feels tight at end range.     Patellar Mobility     Patellar Mobility Comments   Left medial tendon comments: pain and stiffness.     Strength/Myotome Testing     Left Knee   Flexion: 5  Extension: 5  Quadriceps contraction: good    Right Knee   Quadriceps contraction: good      See Exercise, Manual, and Modality Logs for complete treatment.       Assessment/Plan  Patient demonstrating improved L knee strength and pain free ROM. He is likely having patellofemoral symptoms, as kneeling on his knees is painful and previous MRI showed moderate degenerative change of his left patellofemoral joint.    Simplified HEP and reinforced benefits of using stationary bike and gaining plantar flexor length.        Timed:  Manual Therapy:    0     mins  60810;  Therapeutic Exercise:    9     mins  06349;     Neuromuscular Adalberto:   0    mins  71307;    Therapeutic Activity:     23     mins  26361;      Gait Trainin     mins  64251;     Ultrasound:     0     mins  54142;    Electrical Stimulation:    0     mins  56216 ( );    Untimed:  Electrical Stimulation:    0     mins  95470 ( );  Mechanical Traction:    0     mins  60245;   Dry Needlin     mins     Timed Treatment:   32   mins   Total Treatment:     32   mins    Dex Marinelli PT  Physical Therapist

## 2024-08-21 DIAGNOSIS — E29.1 HYPOGONADISM, MALE: ICD-10-CM

## 2024-08-21 DIAGNOSIS — E29.1 HYPOGONADISM IN MALE: ICD-10-CM

## 2024-08-21 RX ORDER — TESTOSTERONE CYPIONATE 200 MG/ML
200 INJECTION, SOLUTION INTRAMUSCULAR
Qty: 4 ML | Refills: 0 | Status: SHIPPED | OUTPATIENT
Start: 2024-08-21

## 2024-08-21 NOTE — TELEPHONE ENCOUNTER
Rx Refill Note  Requested Prescriptions     Pending Prescriptions Disp Refills    Testosterone Cypionate (DEPOTESTOTERONE CYPIONATE) 200 MG/ML injection 4 mL 0     Sig: Inject 1 mL into the appropriate muscle as directed by prescriber Every 7 (Seven) Days.      Last office visit with prescribing clinician:   7/2/2024  Next office visit with prescribing clinician:   1/2/2025    Vipul Moore MA  08/21/24, 08:52 EDT

## 2024-08-21 NOTE — TELEPHONE ENCOUNTER
"Rx Refill Note  Requested Prescriptions     Pending Prescriptions Disp Refills    Needle, Disp, (Hypodermic Needle) 23G X 1\" misc 50 each 11     Sig: Use 1 Device As Needed (Injection of Testosterone).      Last office visit with prescribing clinician: 7/2/2024   Next office visit with prescribing clinician: 1/2/2025     Vipul Moore MA  08/21/24, 08:58 EDT   "

## 2024-08-23 ENCOUNTER — TREATMENT (OUTPATIENT)
Dept: PHYSICAL THERAPY | Facility: CLINIC | Age: 44
End: 2024-08-23
Payer: COMMERCIAL

## 2024-08-23 DIAGNOSIS — Z98.890 HISTORY OF ORTHOPEDIC SURGERY: ICD-10-CM

## 2024-08-23 DIAGNOSIS — M25.562 ACUTE PAIN OF LEFT KNEE: Primary | ICD-10-CM

## 2024-08-23 NOTE — PROGRESS NOTES
Physical Therapy Daily Progress Note    [facility]  [address]  [place] [address]      Patient: Aguila Shen   : 1980  Diagnosis/ICD-10 Code:  Acute pain of left knee [M25.562]  Referring practitioner: Jad Garces MD  Date of Initial Visit: Type: THERAPY  Noted: 2024  Today's Date: 2024  Patient seen for 3 sessions           Subjective   Patient reports bike makes his knee feel better but he has struggled to complete HEP consistently. Has only used the bike and only twice this week. Mild pain currently and after yard work.    Objective          Active Range of Motion   Left Knee   Normal active range of motion    Passive Range of Motion   Left Knee   Normal passive range of motion    Strength/Myotome Testing     Left Knee   Normal strength    Functional Assessment     Comments  Discomfort kneeling onto L knee without cushion between knee and floor      See Exercise, Manual, and Modality Logs for complete treatment.       Assessment/Plan  Patient demonstrates improved L knee flexion ROM and gross strength. Mild pain reported with kneeling onto his L knee without cushion between the ground and his knee. He was advised on importance of HEP to manage aggravated patellofemoral OA moving forward.    Patient verbalized understanding of PT instructions to restore left knee function, but adherence to HEP remains a barrier moving forward.  He will trial HEP only and call back to reschedule if his condition deteriorates.        Timed:  Manual Therapy:    0     mins  44464;  Therapeutic Exercise:    12     mins  77139;     Neuromuscular Adalberto:   0    mins  08140;    Therapeutic Activity:     23     mins  33843;     Gait Trainin     mins  17263;     Ultrasound:     0     mins  36576;    Electrical Stimulation:    0     mins  85933 ( );    Untimed:  Electrical Stimulation:    0     mins  37274 ( );  Mechanical Traction:    0     mins  32215;    Dry Needlin     mins     Timed Treatment:   35   mins   Total Treatment:     35   mins    Dex Marinelli, PT  Physical Therapist

## 2024-09-20 RX ORDER — MELOXICAM 15 MG/1
15 TABLET ORAL DAILY
Qty: 90 TABLET | Refills: 0 | Status: SHIPPED | OUTPATIENT
Start: 2024-09-20

## 2024-09-25 DIAGNOSIS — E29.1 HYPOGONADISM IN MALE: ICD-10-CM

## 2024-09-25 RX ORDER — TESTOSTERONE CYPIONATE 200 MG/ML
200 INJECTION, SOLUTION INTRAMUSCULAR
Qty: 4 ML | Refills: 0 | Status: SHIPPED | OUTPATIENT
Start: 2024-09-25

## 2024-09-27 ENCOUNTER — OFFICE VISIT (OUTPATIENT)
Dept: FAMILY MEDICINE CLINIC | Facility: CLINIC | Age: 44
End: 2024-09-27
Payer: COMMERCIAL

## 2024-09-27 VITALS
DIASTOLIC BLOOD PRESSURE: 70 MMHG | HEART RATE: 74 BPM | WEIGHT: 315 LBS | TEMPERATURE: 97.9 F | BODY MASS INDEX: 42.66 KG/M2 | RESPIRATION RATE: 18 BRPM | HEIGHT: 72 IN | SYSTOLIC BLOOD PRESSURE: 120 MMHG

## 2024-09-27 DIAGNOSIS — E78.5 DYSLIPIDEMIA: ICD-10-CM

## 2024-09-27 DIAGNOSIS — J30.9 ALLERGIC RHINITIS, UNSPECIFIED SEASONALITY, UNSPECIFIED TRIGGER: ICD-10-CM

## 2024-09-27 DIAGNOSIS — E29.1 HYPOGONADISM, MALE: ICD-10-CM

## 2024-09-27 DIAGNOSIS — E11.65 TYPE 2 DIABETES MELLITUS WITH HYPERGLYCEMIA, WITHOUT LONG-TERM CURRENT USE OF INSULIN: Primary | ICD-10-CM

## 2024-09-27 DIAGNOSIS — I10 ESSENTIAL HYPERTENSION: ICD-10-CM

## 2024-09-27 DIAGNOSIS — E03.9 HYPOTHYROIDISM, UNSPECIFIED TYPE: ICD-10-CM

## 2024-09-27 PROCEDURE — 99214 OFFICE O/P EST MOD 30 MIN: CPT | Performed by: FAMILY MEDICINE

## 2024-09-27 RX ORDER — EMPAGLIFLOZIN 25 MG/1
25 TABLET, FILM COATED ORAL DAILY
Qty: 90 TABLET | Refills: 1 | Status: SHIPPED | OUTPATIENT
Start: 2024-09-27

## 2024-09-27 RX ORDER — LISINOPRIL 5 MG/1
5 TABLET ORAL DAILY
Qty: 90 TABLET | Refills: 1 | Status: SHIPPED | OUTPATIENT
Start: 2024-09-27

## 2024-09-27 RX ORDER — LEVOTHYROXINE SODIUM 50 UG/1
50 TABLET ORAL DAILY
Qty: 90 TABLET | Refills: 1 | Status: SHIPPED | OUTPATIENT
Start: 2024-09-27

## 2024-09-27 RX ORDER — AZELASTINE 1 MG/ML
2 SPRAY, METERED NASAL 2 TIMES DAILY
Qty: 30 ML | Refills: 5 | Status: SHIPPED | OUTPATIENT
Start: 2024-09-27

## 2024-09-27 RX ORDER — ROSUVASTATIN CALCIUM 5 MG/1
5 TABLET, COATED ORAL DAILY
Qty: 90 TABLET | Refills: 1 | Status: SHIPPED | OUTPATIENT
Start: 2024-09-27

## 2024-09-28 LAB
ALBUMIN SERPL-MCNC: 4.4 G/DL (ref 3.5–5.2)
ALBUMIN/GLOB SERPL: 1.8 G/DL
ALP SERPL-CCNC: 68 U/L (ref 39–117)
ALT SERPL-CCNC: 32 U/L (ref 1–41)
AST SERPL-CCNC: 24 U/L (ref 1–40)
BASOPHILS # BLD AUTO: 0.02 10*3/MM3 (ref 0–0.2)
BASOPHILS NFR BLD AUTO: 0.3 % (ref 0–1.5)
BILIRUB SERPL-MCNC: 0.8 MG/DL (ref 0–1.2)
BUN SERPL-MCNC: 11 MG/DL (ref 6–20)
BUN/CREAT SERPL: 10.9 (ref 7–25)
CALCIUM SERPL-MCNC: 9.1 MG/DL (ref 8.6–10.5)
CHLORIDE SERPL-SCNC: 100 MMOL/L (ref 98–107)
CO2 SERPL-SCNC: 25.1 MMOL/L (ref 22–29)
CREAT SERPL-MCNC: 1.01 MG/DL (ref 0.76–1.27)
EGFRCR SERPLBLD CKD-EPI 2021: 92.9 ML/MIN/1.73
EOSINOPHIL # BLD AUTO: 0.06 10*3/MM3 (ref 0–0.4)
EOSINOPHIL NFR BLD AUTO: 0.9 % (ref 0.3–6.2)
ERYTHROCYTE [DISTWIDTH] IN BLOOD BY AUTOMATED COUNT: 13.2 % (ref 12.3–15.4)
GLOBULIN SER CALC-MCNC: 2.5 GM/DL
GLUCOSE SERPL-MCNC: 113 MG/DL (ref 65–99)
HBA1C MFR BLD: 6.2 % (ref 4.8–5.6)
HCT VFR BLD AUTO: 50.3 % (ref 37.5–51)
HGB BLD-MCNC: 17.2 G/DL (ref 13–17.7)
IMM GRANULOCYTES # BLD AUTO: 0.01 10*3/MM3 (ref 0–0.05)
IMM GRANULOCYTES NFR BLD AUTO: 0.1 % (ref 0–0.5)
LYMPHOCYTES # BLD AUTO: 2.33 10*3/MM3 (ref 0.7–3.1)
LYMPHOCYTES NFR BLD AUTO: 33.9 % (ref 19.6–45.3)
MCH RBC QN AUTO: 32.1 PG (ref 26.6–33)
MCHC RBC AUTO-ENTMCNC: 34.2 G/DL (ref 31.5–35.7)
MCV RBC AUTO: 94 FL (ref 79–97)
MONOCYTES # BLD AUTO: 0.5 10*3/MM3 (ref 0.1–0.9)
MONOCYTES NFR BLD AUTO: 7.3 % (ref 5–12)
NEUTROPHILS # BLD AUTO: 3.96 10*3/MM3 (ref 1.7–7)
NEUTROPHILS NFR BLD AUTO: 57.5 % (ref 42.7–76)
NRBC BLD AUTO-RTO: 0 /100 WBC (ref 0–0.2)
PLATELET # BLD AUTO: 280 10*3/MM3 (ref 140–450)
POTASSIUM SERPL-SCNC: 4.5 MMOL/L (ref 3.5–5.2)
PROT SERPL-MCNC: 6.9 G/DL (ref 6–8.5)
RBC # BLD AUTO: 5.35 10*6/MM3 (ref 4.14–5.8)
SODIUM SERPL-SCNC: 138 MMOL/L (ref 136–145)
TESTOST SERPL-MCNC: 858 NG/DL (ref 264–916)
WBC # BLD AUTO: 6.88 10*3/MM3 (ref 3.4–10.8)

## 2024-09-30 ENCOUNTER — TELEPHONE (OUTPATIENT)
Dept: FAMILY MEDICINE CLINIC | Facility: CLINIC | Age: 44
End: 2024-09-30
Payer: COMMERCIAL

## 2024-09-30 NOTE — TELEPHONE ENCOUNTER
"Hub staff attempted to follow warm transfer process and was unsuccessful     Caller: Aguila Shen \"Lc\"    Relationship to patient: Self    Best call back number: [phone]    Patient is needing: PATIENT RETURNED MISSED CALL TO DISCUSS HIS LABS    "

## 2024-10-14 ENCOUNTER — TELEPHONE (OUTPATIENT)
Age: 44
End: 2024-10-14
Payer: COMMERCIAL

## 2024-10-14 NOTE — TELEPHONE ENCOUNTER
"Relay     \"Your appointment with Jason Garces PA-C on 01/02/25 has been canceled as Jason Garces will be out of the office that day. We have been unable to contact you to get this appointment rescheduled with Lola Rabago NP. Please call [phone] and we will get your appointment rescheduled at your convenience. Thank you and have a great day. HUB - OK to schedule \" \"                  "

## 2024-10-22 DIAGNOSIS — E29.1 HYPOGONADISM IN MALE: ICD-10-CM

## 2024-10-22 RX ORDER — TESTOSTERONE CYPIONATE 200 MG/ML
200 INJECTION, SOLUTION INTRAMUSCULAR
Qty: 4 ML | Refills: 0 | Status: SHIPPED | OUTPATIENT
Start: 2024-10-22

## 2024-10-22 NOTE — TELEPHONE ENCOUNTER
Rx Refill Note  Requested Prescriptions     Pending Prescriptions Disp Refills    Testosterone Cypionate (DEPOTESTOTERONE CYPIONATE) 200 MG/ML injection 4 mL 0     Sig: Inject 1 mL into the appropriate muscle as directed by prescriber Every 7 (Seven) Days.      Last office visit with prescribing clinician: 7/2/2024  Last telemedicine visit with prescribing clinician: Visit date not found   Next office visit with prescribing clinician: Visit date not found     Viji Altamirano MA  10/22/24, 07:50 EDT

## 2024-10-23 NOTE — TELEPHONE ENCOUNTER
"     Name: Ac Aguila Lc \"Lc\"    Relationship: Self    Best Callback Number:  [phone]     HUB PROVIDED THE RELAY MESSAGE FROM THE OFFICE   PATIENT VOICED UNDERSTANDING AND HAS NO FURTHER QUESTIONS AT THIS TIME    ADDITIONAL INFORMATION:  APPT WITH IKE ROSADO IN [place] WAS SCHEDULED IN ERROR. APPT HAS BEEN CANCELLED.     PT DOES NOT WANT TO GO TO [facility] OFFICE AND DID NOT WANT TO RESCHEDULE.    PT STATES HE WILL FIND A NEW UROLOGIST AND WILL REQUEST MED REC AT SOME POINT IN TIME.     "

## 2024-10-23 NOTE — TELEPHONE ENCOUNTER
Called and spoke to PT who does not wish to reschedule with Lola Rabago NP at this time as he does not wish to drive to [facility]. Will call when he finds a new urologist and will request records to be sent.

## 2024-11-15 DIAGNOSIS — E29.1 HYPOGONADISM IN MALE: ICD-10-CM

## 2024-11-15 RX ORDER — TESTOSTERONE CYPIONATE 200 MG/ML
200 INJECTION, SOLUTION INTRAMUSCULAR
Qty: 4 ML | Refills: 0 | Status: SHIPPED | OUTPATIENT
Start: 2024-11-15

## 2024-12-17 ENCOUNTER — PATIENT MESSAGE (OUTPATIENT)
Dept: FAMILY MEDICINE CLINIC | Facility: CLINIC | Age: 44
End: 2024-12-17
Payer: COMMERCIAL

## 2024-12-17 DIAGNOSIS — E29.1 HYPOGONADISM IN MALE: ICD-10-CM

## 2024-12-18 RX ORDER — TESTOSTERONE CYPIONATE 200 MG/ML
200 INJECTION, SOLUTION INTRAMUSCULAR
Qty: 4 ML | Refills: 0 | Status: SHIPPED | OUTPATIENT
Start: 2024-12-18

## 2024-12-18 RX ORDER — MELOXICAM 15 MG/1
15 TABLET ORAL DAILY
Qty: 90 TABLET | Refills: 0 | Status: SHIPPED | OUTPATIENT
Start: 2024-12-18

## 2024-12-18 NOTE — TELEPHONE ENCOUNTER
Rx Refill Note  Requested Prescriptions     Pending Prescriptions Disp Refills    Testosterone Cypionate (DEPOTESTOTERONE CYPIONATE) 200 MG/ML injection 4 mL 0     Sig: Inject 1 mL into the appropriate muscle as directed by prescriber Every 7 (Seven) Days.      Last office visit with prescribing clinician: 7/2/2024  Last telemedicine visit with prescribing clinician: Visit date not found   Next office visit with prescribing clinician: Visit date not found     Viji Altamirano MA  12/18/24, 08:00 EST